# Patient Record
Sex: FEMALE | Race: WHITE | NOT HISPANIC OR LATINO | Employment: OTHER | URBAN - METROPOLITAN AREA
[De-identification: names, ages, dates, MRNs, and addresses within clinical notes are randomized per-mention and may not be internally consistent; named-entity substitution may affect disease eponyms.]

---

## 2017-11-16 ENCOUNTER — OFFICE VISIT (OUTPATIENT)
Dept: INTERNAL MEDICINE | Facility: CLINIC | Age: 57
End: 2017-11-16
Payer: COMMERCIAL

## 2017-11-16 VITALS
TEMPERATURE: 98 F | WEIGHT: 240.94 LBS | HEART RATE: 78 BPM | RESPIRATION RATE: 16 BRPM | DIASTOLIC BLOOD PRESSURE: 70 MMHG | BODY MASS INDEX: 41.13 KG/M2 | OXYGEN SATURATION: 97 % | HEIGHT: 64 IN | SYSTOLIC BLOOD PRESSURE: 110 MMHG

## 2017-11-16 DIAGNOSIS — M85.80 OSTEOPENIA, UNSPECIFIED LOCATION: ICD-10-CM

## 2017-11-16 DIAGNOSIS — Z23 NEED FOR PNEUMOCOCCAL VACCINATION: ICD-10-CM

## 2017-11-16 DIAGNOSIS — Z86.2 HISTORY OF ANEMIA: ICD-10-CM

## 2017-11-16 DIAGNOSIS — Z13.6 SCREENING FOR CARDIOVASCULAR CONDITION: ICD-10-CM

## 2017-11-16 DIAGNOSIS — R60.9 EDEMA, UNSPECIFIED TYPE: ICD-10-CM

## 2017-11-16 DIAGNOSIS — Z85.3 HISTORY OF CANCER OF LEFT BREAST: Primary | ICD-10-CM

## 2017-11-16 PROCEDURE — 90732 PPSV23 VACC 2 YRS+ SUBQ/IM: CPT | Mod: S$GLB,,, | Performed by: INTERNAL MEDICINE

## 2017-11-16 PROCEDURE — 90471 IMMUNIZATION ADMIN: CPT | Mod: S$GLB,,, | Performed by: INTERNAL MEDICINE

## 2017-11-16 PROCEDURE — 99204 OFFICE O/P NEW MOD 45 MIN: CPT | Mod: 25,S$GLB,, | Performed by: INTERNAL MEDICINE

## 2017-11-16 RX ORDER — MULTIVITAMIN
1 TABLET ORAL DAILY
COMMUNITY

## 2017-11-16 RX ORDER — RALOXIFENE HYDROCHLORIDE 60 MG/1
60 TABLET, FILM COATED ORAL DAILY
COMMUNITY
End: 2021-10-27 | Stop reason: SDUPTHER

## 2017-11-16 RX ORDER — SUCRALFATE 1 G/1
1 TABLET ORAL 2 TIMES DAILY
COMMUNITY
End: 2023-04-26 | Stop reason: ALTCHOICE

## 2017-11-16 RX ORDER — CELECOXIB 200 MG/1
200 CAPSULE ORAL
COMMUNITY
End: 2021-04-13

## 2017-11-16 RX ORDER — ZOLPIDEM TARTRATE 5 MG/1
5 TABLET ORAL NIGHTLY PRN
COMMUNITY
End: 2019-08-13 | Stop reason: SDUPTHER

## 2017-11-16 RX ORDER — HYDROCHLOROTHIAZIDE 25 MG/1
25 TABLET ORAL DAILY
Qty: 30 TABLET | Refills: 11 | Status: SHIPPED | OUTPATIENT
Start: 2017-11-16 | End: 2018-05-31 | Stop reason: CLARIF

## 2017-11-16 RX ORDER — ESCITALOPRAM OXALATE 10 MG/1
10 TABLET ORAL DAILY
COMMUNITY
End: 2023-04-26 | Stop reason: SDUPTHER

## 2017-11-16 NOTE — PROGRESS NOTES
"Subjective:      Patient ID: Luz Duke is a 57 y.o. female.    Chief Complaint: Establish Care    HPI: 57 y.o. White female,  -breast Ca, left, 3/05. Lumpectomy, 8 chemo Rx, 30 radiation treatments. Last  Mammogram 9/17  With a bone density. Long term survivor. Sees Dr. Vazquez in  [facility]. Sees him once a year.  -Treatment for osteopenia.  -Gastric bypass, 2004. Has had anemia, which had a work up with both EGD,colonoscopy.  -Shingles in 7/17.  -Had a flu shot at [employer].  -Plantar fasciitis, had a right bunionectomy.  Reviewed SH,FH ( strong paternal history of cancers).    Review of Systems   Constitutional: Negative.    HENT: Negative.    Eyes: Negative.    Respiratory: Negative.    Cardiovascular: Positive for leg swelling. Negative for chest pain and palpitations.   Gastrointestinal: Negative.    Endocrine: Negative.    Genitourinary: Negative.    Musculoskeletal: Negative.    Skin: Negative.    Allergic/Immunologic: Negative.    Neurological: Negative.    Hematological: Negative.    Psychiatric/Behavioral: Negative.        Objective:   /70 (BP Location: Right arm, Patient Position: Sitting, BP Method: Large (Manual))   Pulse 78   Temp 97.6 °F (36.4 °C) (Oral)   Resp 16   Ht 5' 4" (1.626 m)   Wt 109.3 kg (240 lb 15.4 oz)   SpO2 97%   BMI 41.36 kg/m²     Physical Exam   Constitutional: She is oriented to person, place, and time. She appears well-developed and well-nourished.   HENT:   Head: Normocephalic and atraumatic.   Right Ear: External ear normal.   Left Ear: External ear normal.   Nose: Nose normal.   Mouth/Throat: Oropharynx is clear and moist.   Eyes: Conjunctivae and EOM are normal. Pupils are equal, round, and reactive to light.   Neck: Normal range of motion. Neck supple. No JVD present.   Cardiovascular: Normal rate, regular rhythm and normal heart sounds.    Pulmonary/Chest: Effort normal and breath sounds normal.   Abdominal: Soft. Bowel sounds are normal.   Musculoskeletal: " She exhibits edema.   Lymphadenopathy:     She has no cervical adenopathy.   Neurological: She is alert and oriented to person, place, and time.   Skin: Skin is warm and dry.   Psychiatric: She has a normal mood and affect. Her behavior is normal.   Nursing note and vitals reviewed.      Assessment:     1. History of cancer of left breast    2. Osteopenia, unspecified location    3. History of anemia    4. Screening for cardiovascular condition    5. Edema, unspecified type    6. Need for pneumococcal vaccination      Plan:     History of cancer of left breast  -     CBC auto differential; Future; Expected date: 11/16/2017  -     Comprehensive metabolic panel; Future; Expected date: 11/16/2017  -     Urinalysis; Future; Expected date: 11/16/2017    Osteopenia, unspecified location    History of anemia  -     Iron and TIBC; Future; Expected date: 11/16/2017    Screening for cardiovascular condition  -     Lipid panel; Future; Expected date: 11/16/2017    Edema, unspecified type  -     hydroCHLOROthiazide (HYDRODIURIL) 25 MG tablet; Take 1 tablet (25 mg total) by mouth once daily.  Dispense: 30 tablet; Refill: 11    Need for pneumococcal vaccination  -     Pneumococcal Polysaccharide Vaccine (23 Valent) (SQ/IM)

## 2018-02-20 ENCOUNTER — OFFICE VISIT (OUTPATIENT)
Dept: INTERNAL MEDICINE | Facility: CLINIC | Age: 58
End: 2018-02-20
Payer: COMMERCIAL

## 2018-02-20 VITALS
BODY MASS INDEX: 40.76 KG/M2 | HEART RATE: 71 BPM | WEIGHT: 238.75 LBS | OXYGEN SATURATION: 97 % | RESPIRATION RATE: 18 BRPM | HEIGHT: 64 IN | DIASTOLIC BLOOD PRESSURE: 66 MMHG | SYSTOLIC BLOOD PRESSURE: 96 MMHG

## 2018-02-20 DIAGNOSIS — I73.9 PVD (PERIPHERAL VASCULAR DISEASE): Primary | ICD-10-CM

## 2018-02-20 DIAGNOSIS — R06.83 SNORING: ICD-10-CM

## 2018-02-20 DIAGNOSIS — R55 SYNCOPE, UNSPECIFIED SYNCOPE TYPE: ICD-10-CM

## 2018-02-20 PROCEDURE — 99999 PR PBB SHADOW E&M-EST. PATIENT-LVL IV: CPT | Mod: PBBFAC,,, | Performed by: INTERNAL MEDICINE

## 2018-02-20 PROCEDURE — 93010 ELECTROCARDIOGRAM REPORT: CPT | Mod: S$GLB,,, | Performed by: INTERNAL MEDICINE

## 2018-02-20 PROCEDURE — 93005 ELECTROCARDIOGRAM TRACING: CPT | Mod: S$GLB,,, | Performed by: INTERNAL MEDICINE

## 2018-02-20 PROCEDURE — 99214 OFFICE O/P EST MOD 30 MIN: CPT | Mod: S$GLB,,, | Performed by: INTERNAL MEDICINE

## 2018-02-20 PROCEDURE — 3008F BODY MASS INDEX DOCD: CPT | Mod: S$GLB,,, | Performed by: INTERNAL MEDICINE

## 2018-02-20 NOTE — PROGRESS NOTES
"Subjective:      Patient ID: Luz Duke is a 57 y.o. female.    Chief Complaint: Osteopenia; History of cancer of left breast; and Fall (during [place]ardi [place], thinks she blacked out)    HPI: 57 y.o. White female , while at [place], went into the bathroom to urinate, came out of bathroom and awakened later ( unknown time) on the floor sitting up.  She remembers nothing. No bowel or bladder loss. No headache,dizziness,CP,palpitations,SOB,edema,blood.  Has never had that before or after. No ETOH involved; however, may have over indulged with food that evening.  No vomiting,diarrhea.  No dysuria.  Has a painful tailbone.    Review of Systems   Constitutional: Negative.    HENT: Negative.    Eyes: Negative for photophobia, pain, discharge, redness, itching and visual disturbance.   Respiratory: Negative for choking, shortness of breath and wheezing.         Snores at night, awakens people   Cardiovascular: Negative.    Gastrointestinal: Negative.    Endocrine: Negative.    Genitourinary: Negative.    Musculoskeletal: Negative.    Skin: Negative.    Allergic/Immunologic: Negative.    Neurological: Positive for syncope. Negative for dizziness, tremors, seizures, facial asymmetry, speech difficulty, weakness, light-headedness, numbness and headaches.   Hematological: Negative.    Psychiatric/Behavioral: Negative.        Objective:   BP 96/66 (BP Location: Right arm, Patient Position: Sitting, BP Method: Large (Manual))   Pulse 71   Resp 18   Ht 5' 4" (1.626 m)   Wt 108.3 kg (238 lb 12.1 oz)   SpO2 97%   BMI 40.98 kg/m²     Physical Exam   Constitutional: She is oriented to person, place, and time. She appears well-developed and well-nourished.   HENT:   Head: Normocephalic and atraumatic.   Right Ear: External ear normal.   Left Ear: External ear normal.   Nose: Nose normal.   Mouth/Throat: Oropharynx is clear and moist.   Eyes: Conjunctivae and EOM are normal. Pupils are equal, round, and reactive to light. "   Neck: Normal range of motion. Neck supple.   Cardiovascular: Normal rate, regular rhythm and normal heart sounds.    Pulmonary/Chest: Effort normal and breath sounds normal.   Abdominal: Soft. Bowel sounds are normal.   Musculoskeletal: Normal range of motion.   Neurological: She is alert and oriented to person, place, and time.   Skin: Skin is warm and dry.   Psychiatric: She has a normal mood and affect. Her behavior is normal. Judgment and thought content normal.   Nursing note and vitals reviewed.      Assessment:     1. PVD (peripheral vascular disease)    2. Snoring    3. Syncope, unspecified syncope type    May have had a vaso vagal episode, no lasting effects.  If any new symptoms will direct a work up in that area.  Plan:     PVD (peripheral vascular disease)  -     COMPRESSION STOCKINGS  -     Lipid panel; Future; Expected date: 05/21/2018    Snoring  -     Ambulatory consult to Sleep Disorders  -     CBC auto differential; Future; Expected date: 05/23/2018  -     Comprehensive metabolic panel; Future; Expected date: 05/23/2018  -     Urinalysis; Future; Expected date: 05/21/2018    Syncope, unspecified syncope type  -     EKG 12-lead; Future; Expected date: 02/20/2018

## 2018-04-24 ENCOUNTER — TELEPHONE (OUTPATIENT)
Dept: ADMINISTRATIVE | Facility: HOSPITAL | Age: 58
End: 2018-04-24

## 2018-04-24 NOTE — TELEPHONE ENCOUNTER
This patient is part of a group of patients listed from the insurance company needing statin therapy, r/t PVD dx. Please place rx order or verify the dx is correct

## 2018-05-25 ENCOUNTER — OFFICE VISIT (OUTPATIENT)
Dept: SLEEP MEDICINE | Facility: CLINIC | Age: 58
End: 2018-05-25
Payer: COMMERCIAL

## 2018-05-25 VITALS
BODY MASS INDEX: 41.55 KG/M2 | HEIGHT: 64 IN | DIASTOLIC BLOOD PRESSURE: 78 MMHG | SYSTOLIC BLOOD PRESSURE: 132 MMHG | WEIGHT: 243.38 LBS | HEART RATE: 70 BPM

## 2018-05-25 DIAGNOSIS — G47.30 SLEEP APNEA, UNSPECIFIED TYPE: Primary | ICD-10-CM

## 2018-05-25 PROBLEM — G47.00 INSOMNIA, UNSPECIFIED: Status: ACTIVE | Noted: 2018-05-25

## 2018-05-25 PROBLEM — K21.9 GASTROESOPHAGEAL REFLUX DISEASE WITHOUT ESOPHAGITIS: Status: ACTIVE | Noted: 2018-05-25

## 2018-05-25 PROCEDURE — 99999 PR PBB SHADOW E&M-EST. PATIENT-LVL III: CPT | Mod: PBBFAC,,, | Performed by: NURSE PRACTITIONER

## 2018-05-25 PROCEDURE — 99204 OFFICE O/P NEW MOD 45 MIN: CPT | Mod: S$GLB,,, | Performed by: NURSE PRACTITIONER

## 2018-05-25 PROCEDURE — 3008F BODY MASS INDEX DOCD: CPT | Mod: CPTII,S$GLB,, | Performed by: NURSE PRACTITIONER

## 2018-05-25 NOTE — PROGRESS NOTES
Luz Duke  was seen as a new patient, referred by Dr. DANTE Dover , for the evaluation of obstructive sleep apnea.     CHIEF COMPLAINT: Snoring, excessively tired    HISTORY OF PRESENT ILLNESS: Luz Duke a 57 y.o. female presents for the evaluation of obstructive sleep apnea. She had a sleep study done 2003 at [facility]. Tried CPAP x 2mos then returned it. Was told she just needed to use it for a short time.Snoring was resolved. Current disruptive snoring at times. Nocturia 1x but easily returns to sleep. Wakes up un-refreshed, feels like she could sleep longer. Snoozes during evening plays.  Denies witnessed apneic pauses. R side sleeper and doesn't move much during sleep.     Denies symptoms of restless legs or kicking during sleep.   Weight watchers group - has lost 30# the past year.   GERD- carafate bid  Breathe right strips partially effective    EPWORTH SLEEPINESS SCALE 5/24/2018   Sitting and reading 1   Watching TV 0   Sitting, inactive in a public place (e.g. a theatre or a meeting) 3   As a passenger in a car for an hour without a break 3   Lying down to rest in the afternoon when circumstances permit 0   Sitting and talking to someone 0   Sitting quietly after a lunch without alcohol 0   In a car, while stopped for a few minutes in traffic 0   Total score 7         Sleep Clinic New Patient 5/24/2018   What time do you go to bed on a week day? (Give a range) 11 to 12   What time do you go to bed on a day off? (Give a range) Same   How long does it take you to fall asleep? (Give a range) Minutes   How long does it take you to fall back into sleep? (Give a range) Usually minutes , very rare longer   What time do you wake up to start your day on a week day? (Give a range) 9:00   What time do you wake up to start your day on a day off? (Give a range) 9:00   What time do you get out of bed? (Give a range) When I wake up   Rate your sleep quality from 0 to 5 (0-poor, 5-great). 3   Have you  "experienced:  N/a   Have you ever had a sleep study/CPAP machine/surgery for sleep apnea? Yes   Have you ever had a CPAP machine for sleep apnea? No   Have you ever had surgery for sleep apnea? No         FAMILY HISTORY: No known sleep disorders.     SOCIAL HISTORY: . rare ETOH, no tobacco, retired    REVIEW OF SYSTEMS:  Sleep related symptoms as per HPI; +overweight  Sleep Clinic ROS  5/24/2018   Difficulty breathing through the nose?  Seasonal congestion   Sore throat or dry mouth in the morning? No   Irregular or very fast heart beat?  No   Shortness of breath?  No   Acid reflux? Sometimes   Body aches and pains?  Sometimes   Morning headaches? No   Dizziness? No   Mood changes?  No   Do you exercise?  No   Do you feel like moving your legs a lot?  No           PHYSICAL EXAM:   /78   Pulse 70   Ht 5' 4" (1.626 m)   Wt 110.4 kg (243 lb 6.4 oz)   BMI 41.78 kg/m²   GENERAL: morbid obese body habitus, well groomed   HEENT: Conjunctivae are non-erythematous; Pupils equal, round, and reactive to light; Nose is symmetrical; Nasal mucosa is normal; Septum is midline; Inferior turbinates are normal; Nasal airflow is normal; Posterior pharynx is pink; Modified Mallampati: IV; Posterior palate is low; Tonsils not seen; Uvula is long/thick and pink, can't see distal tip;Tongue is high, broad; Dentition is fair; No TMJ tenderness; Jaw opening and protrusion without click and without discomfort.   NECK: Supple. Neck circumference is 15 inches. No thyromegaly. No palpable nodes.   SKIN: On face and neck: No abrasions, no rashes, no lesions. No subcutaneous nodules are palpable.   RESPIRATORY: Chest is clear to auscultation. Normal chest expansion and non-labored breathing at rest.   CARDIOVASCULAR: Normal S1, S2. No murmurs, gallops or rubs. No carotid bruits bilaterally.   EXTREMITIES: non-pitting swelling left lower leg.  No clubbing. No cyanosis. Station normal. Gait normal.   NEURO/PSYCH: Oriented to time, " place and person. Normal attention span and concentration. Affect is full. Mood is normal.       ASSESSMENT:     Unspecified Sleep Apnea, with symptoms of disruptive snoring, un-refreshing disrupted sleep and daytime sleepiness, with exam findings of a crowded oral airway, with medical comorbidities of GERD, morbid obesity. Warrants further investigation for untreated sleep apnea.     PLAN:   1. Polysomnogram, discussed plan of care   2. Discussed etiology of SIMONA and potential ramifications of untreated SIMONA, including stroke, heart disease, HTN.  We discussed potential treatment options, which could include weight loss (10-15%), body positioning, continuous positive airway pressure (CPAP-definitive), mandibular advancement splint by dentist, or referral for surgical consideration.   3. The patient was advised to abstain from driving should she feel sleepy or drowsy.   4. I will see the patient back after the study has been completed to review test results and plan of care.   5. Encouraged continued weight loss efforts for potential improvement of SIMONA and overall health benefits, continue breathe right strips    Thank you for allowing me the opportunity to participate in the care of your patient

## 2018-05-25 NOTE — PATIENT INSTRUCTIONS
Obstructive Sleep Apnea  Obstructive sleep apnea is a condition that causes your air passages to become narrowed or blocked during sleep. As a result, breathing stops for short periods. Your body wakes up enough for breathing to begin again, though you don't remember it. The cycle of stopped breathing and brief awakenings can repeat dozens of times a night. This prevents the body from getting to the deeper stages of sleep that are needed for good rest and may cause your body's oxygen level to fall.  Signs of sleep apnea include loud snoring, noisy breathing, and gasping sounds during sleep. Daytime symptoms include waking up tired after a full night's sleep, waking up with headaches, feeling very sleepy or falling asleep during the day, and having problems with memory or concentration.  Risk factors for sleep apnea include:  · Being overweight  · Being a man, or a woman in menopause  · Smoking  · Using alcohol or sedating medicines  · Having enlarged structures in the nose or throat  Home care  Lifestyle changes that can help treat snoring and sleep apnea include the following:  · If you are overweight, lose weight. Talk to your healthcare provider about a weight-loss plan for you.  · Avoid alcohol for 3 to 4 hours before bedtime. Avoid sedating medications. Ask your healthcare provider about the medicines you take.  · If you smoke, talk to your healthcare provider about ways to quit.  · Sleep on your side. This can help prevent gravity from pulling relaxed throat tissues into your breathing passages.  · If you have allergies or sinus problems that block your nose, ask your healthcare provider for help.  Follow-up care  Follow up with your healthcare provider, or as advised. A diagnosis of sleep apnea is made with a sleep study. Your healthcare provider can tell you more about this test.  When to seek medical advice  Sleep apnea can make you more likely to have certain health problems. These include high blood  pressure, heart attack, stroke, and sexual dysfunction. If you have sleep apnea, talk to your healthcare provider about the best treatments for you.  Date Last Reviewed: 4/1/2017 © 2000-2017 [employer]. [address] [place] [address], [place] [address]. All rights reserved. This information is not intended as a substitute for professional medical care. Always follow your healthcare professional's instructions.        What Are Snoring and Obstructive Sleep Apnea?  If youve ever had a stuffed-up nose, you know the feeling of trying to breathe through a very narrow passageway. This is what happens in your throat when you snore. While you sleep, structures in your throat partially block your air passage, making the passage narrow and hard to breathe through. If the entire passage becomes blocked and you cant breathe at all, you have sleep apnea.      Snoring Obstructive sleep apnea   Snoring  If your throat structures are too large or the muscles relax too much during sleep, the air passage may be partially blocked. As air from the nose or mouth passes around this blockage, the throat structures vibrate, causing the familiar sound of snoring. At times, this sound can be so loud that snorers wake up others, or even themselves, during the night. Snoring gets worse as more and more of the air passage is blocked.  Obstructive sleep apnea  If the structures completely block the throat, air cant flow to the lungs at all. This is called apnea (meaning no breathing). Since the lungs arent getting fresh air, the brain tells the body to wake up just enough to tighten the muscles and unblock the air passage. With a loud gasp, breathing begins again. This process may be repeated over and over again throughout the night, making your sleep fragmented with a lighter stage of sleep. Even though you do not remember waking up many times during the night to a lighter sleep, you feel tired the next day. The lack of sleep and  fresh air can also strain your lungs, heart, and other organs, leading to problems such as high blood pressure, heart attack, or stroke.  Problems in the nose and jaw  Problems in the structure of the nose may obstruct breathing. A crooked (deviated) septum or swollen turbinates can make snoring worse or lead to apnea. Also, a receding jaw may make the tongue sit too far back, so its more likely to block the airway when youre asleep.        Date Last Reviewed: 7/18/2015  © 7779-0140 [employer]. [address] [place] [address]. All rights reserved. This information is not intended as a substitute for professional medical care. Always follow your healthcare professional's instructions.      Abena or Calvin will contact you to schedule your sleep study. Their number is [phone] (ext 2). The [facility] is located on 7th floor of the [facility].    We will call you when the sleep study results are ready - if you have not heard from us by 2 weeks from the date of the study, please call [phone] (ext 1).    You are advised to abstain from driving should you feel sleepy or drowsy.

## 2018-05-25 NOTE — LETTER
May 25, 2018      Sam Dover MD  [address]  [address]  [place] [address]           [facility]  [address] [address]  Phone: [phone]  Fax: [phone]          Patient: Luz Duke   MR Number: [id number]   YOB: 1960   Date of Visit: 5/25/2018       Dear Dr. Sam Dover:    Thank you for referring Luz Duke to me for evaluation. Attached you will find relevant portions of my assessment and plan of care.    If you have questions, please do not hesitate to call me. I look forward to following Luz Duke along with you.    Sincerely,    Angie Ledesma, NP    Enclosure  CC:  No Recipients    If you would like to receive this communication electronically, please contact [email or web address] or [phone] to request more information on [employer] Link access.    For providers and/or their staff who would like to refer a patient to [facility], please contact us through our one-stop-shop provider referral line, [facility] , at [phone].    If you feel you have received this communication in error or would no longer like to receive these types of communications, please e-mail [email or web address]

## 2018-05-31 ENCOUNTER — OFFICE VISIT (OUTPATIENT)
Dept: INTERNAL MEDICINE | Facility: CLINIC | Age: 58
End: 2018-05-31
Payer: COMMERCIAL

## 2018-05-31 VITALS
SYSTOLIC BLOOD PRESSURE: 120 MMHG | DIASTOLIC BLOOD PRESSURE: 62 MMHG | WEIGHT: 244.5 LBS | HEART RATE: 86 BPM | OXYGEN SATURATION: 97 % | HEIGHT: 64 IN | BODY MASS INDEX: 41.74 KG/M2

## 2018-05-31 DIAGNOSIS — E87.6 HYPOKALEMIA: ICD-10-CM

## 2018-05-31 DIAGNOSIS — R60.0 LEG EDEMA, LEFT: Primary | ICD-10-CM

## 2018-05-31 PROCEDURE — 99213 OFFICE O/P EST LOW 20 MIN: CPT | Mod: S$GLB,,, | Performed by: INTERNAL MEDICINE

## 2018-05-31 PROCEDURE — 99999 PR PBB SHADOW E&M-EST. PATIENT-LVL III: CPT | Mod: PBBFAC,,, | Performed by: INTERNAL MEDICINE

## 2018-05-31 PROCEDURE — 3008F BODY MASS INDEX DOCD: CPT | Mod: CPTII,S$GLB,, | Performed by: INTERNAL MEDICINE

## 2018-05-31 RX ORDER — HYDROCHLOROTHIAZIDE 25 MG/1
25 TABLET ORAL DAILY
Refills: 11 | COMMUNITY
Start: 2018-05-01 | End: 2018-05-31 | Stop reason: CLARIF

## 2018-05-31 RX ORDER — SPIRONOLACTONE 50 MG/1
50 TABLET, FILM COATED ORAL DAILY
Qty: 30 TABLET | Refills: 11 | Status: SHIPPED | OUTPATIENT
Start: 2018-05-31 | End: 2019-05-11 | Stop reason: SDUPTHER

## 2018-05-31 NOTE — PROGRESS NOTES
Subjective:      Patient ID: Luz Duke is a 57 y.o. female.    Chief Complaint: Follow-up    HPI: 57 y.o. White female , here for follow up.  Has her apt with GI tomorrow, Gyn in Aug, saw the Optho.  Went for initial interview for sleep study, they will call for a follow up.  Asymptomatic.  Labs:   05/16/18 0807 HDL 63 - Final result   05/16/18 0807 CHOL 166 - Final result   05/16/18 0807 TRIG 79 - Final result   05/16/18 0807 LDLCALC 87.2 - Final result   05/16/18 0807 CHOLHDL 38.0 - Final result   05/16/18 0807 NONHDLCHOL 103 - Final result   05/16/18 0807 TOTALCHOLEST 2.6 - Final result   05/16/18 0807 COLORU Yellow - Final result   05/16/18 0807 APPEARANCEUA Clear - Final result   05/16/18 0807 SPECGRAV 1.015 - Final result   05/16/18 0807 PHUR 7.0 - Final result   05/16/18 0807 KETONESU Negative - Final result   05/16/18 0807 OCCULTUA Negative - Final result   05/16/18 0807 NITRITE Negative - Final result   05/16/18 0807 UROBILINOGEN Negative - Final result   05/16/18 0807 LEUKOCYTESUR Negative - Final result   05/16/18 0807 WBC 6.69 - Final result   05/16/18 0807 RBC 4.59 - Final result   05/16/18 0807 HGB 13.2 - Final result   05/16/18 0807 HCT 40.0 - Final result   05/16/18 0807 MCH 28.8 - Final result   05/16/18 0807 RDW 13.5 - Final result   05/16/18 0807  - Final result   05/16/18 0807 MPV 11.3 - Final result   05/16/18 0807 GLU 83 - Final result   05/16/18 0807 BUN 14 - Final result   05/16/18 0807 CREATININE 0.52 - Final result   05/16/18 0807 CALCIUM 8.8 - Final result   05/16/18 0807  - Final result   05/16/18 0807 K 3.3 Low Final result   05/16/18 0807  - Final result   05/16/18 0807 PROT 6.3 - Final result   05/16/18 0807 ALBUMIN 3.6 - Final result   05/16/18 0807 BILITOT 0.4 - Final result   05/16/18 0807 AST 26 - Final result   05/16/18 0807 ALKPHOS 73 - Final result   05/16/18 0807 CO2 31 High Final result   05/16/18 0807 ALT 30 - Final result   05/16/18 0807 ANIONGAP 8  "- Final result   05/16/18 0807 EGFRNONAA >60.0 - Final result   05/16/18 0807 ESTGFRAFRICA >60.0 - Final result   05/16/18 0807             Review of Systems   Constitutional: Negative for activity change and unexpected weight change.   HENT: Negative for hearing loss, rhinorrhea and trouble swallowing.    Eyes: Negative for discharge and visual disturbance.   Respiratory: Negative for chest tightness and wheezing.    Cardiovascular: Negative for chest pain and palpitations.   Gastrointestinal: Negative for blood in stool, constipation, diarrhea and vomiting.   Endocrine: Negative for polydipsia and polyuria.   Genitourinary: Negative for difficulty urinating, dysuria, hematuria and menstrual problem.   Musculoskeletal: Positive for neck pain. Negative for arthralgias and joint swelling.   Neurological: Negative for weakness and headaches.   Psychiatric/Behavioral: Negative for confusion and dysphoric mood.       Objective:   /62   Pulse 86   Ht 5' 4" (1.626 m)   Wt 110.9 kg (244 lb 8 oz)   SpO2 97%   BMI 41.97 kg/m²     Physical Exam   Constitutional: She is oriented to person, place, and time. She appears well-developed and well-nourished.   HENT:   Head: Normocephalic and atraumatic.   Nose: Nose normal.   Mouth/Throat: Oropharynx is clear and moist.   Eyes: Conjunctivae and EOM are normal.   Neck: Neck supple.   Cardiovascular: Normal rate, regular rhythm and normal heart sounds.    Pulmonary/Chest: Effort normal and breath sounds normal.   Musculoskeletal: Normal range of motion.   Neurological: She is alert and oriented to person, place, and time.   Skin: Skin is warm and dry.   Psychiatric: She has a normal mood and affect. Her behavior is normal.   Nursing note and vitals reviewed.      Assessment:     1. Leg edema, left    2. Hypokalemia      Plan:     Leg edema, left  -     spironolactone (ALDACTONE) 50 MG tablet; Take 1 tablet (50 mg total) by mouth once daily.  Dispense: 30 tablet; Refill: " 11    Hypokalemia  -     Basic metabolic panel; Future; Expected date: 09/28/2018

## 2018-06-05 DIAGNOSIS — G47.30 SLEEP APNEA, UNSPECIFIED TYPE: Primary | ICD-10-CM

## 2018-06-14 ENCOUNTER — TELEPHONE (OUTPATIENT)
Dept: SLEEP MEDICINE | Facility: OTHER | Age: 58
End: 2018-06-14

## 2018-06-15 ENCOUNTER — HOSPITAL ENCOUNTER (OUTPATIENT)
Dept: SLEEP MEDICINE | Facility: OTHER | Age: 58
Discharge: HOME | End: 2018-06-15
Attending: NURSE PRACTITIONER
Payer: COMMERCIAL

## 2018-06-15 DIAGNOSIS — G47.20 SLEEP STAGE DYSFUNCTION: ICD-10-CM

## 2018-06-15 DIAGNOSIS — G47.33 OSA (OBSTRUCTIVE SLEEP APNEA): ICD-10-CM

## 2018-06-15 DIAGNOSIS — G47.30 SLEEP APNEA, UNSPECIFIED TYPE: ICD-10-CM

## 2018-06-15 PROCEDURE — 95800 SLP STDY UNATTENDED: CPT

## 2018-06-15 PROCEDURE — 95806 SLEEP STUDY UNATT&RESP EFFT: CPT | Mod: 26,,, | Performed by: PSYCHIATRY & NEUROLOGY

## 2018-07-11 ENCOUNTER — PATIENT MESSAGE (OUTPATIENT)
Dept: SLEEP MEDICINE | Facility: CLINIC | Age: 58
End: 2018-07-11

## 2018-07-11 DIAGNOSIS — G47.30 SLEEP APNEA, UNSPECIFIED TYPE: Primary | ICD-10-CM

## 2018-09-05 ENCOUNTER — PATIENT MESSAGE (OUTPATIENT)
Dept: FAMILY MEDICINE | Facility: CLINIC | Age: 58
End: 2018-09-05

## 2018-09-25 ENCOUNTER — OFFICE VISIT (OUTPATIENT)
Dept: INTERNAL MEDICINE | Facility: CLINIC | Age: 58
End: 2018-09-25
Payer: COMMERCIAL

## 2018-09-25 ENCOUNTER — TELEPHONE (OUTPATIENT)
Dept: INTERNAL MEDICINE | Facility: CLINIC | Age: 58
End: 2018-09-25

## 2018-09-25 VITALS
SYSTOLIC BLOOD PRESSURE: 110 MMHG | BODY MASS INDEX: 42.9 KG/M2 | OXYGEN SATURATION: 97 % | HEART RATE: 90 BPM | HEIGHT: 64 IN | RESPIRATION RATE: 16 BRPM | WEIGHT: 251.31 LBS | DIASTOLIC BLOOD PRESSURE: 70 MMHG | TEMPERATURE: 99 F

## 2018-09-25 DIAGNOSIS — R60.0 EDEMA OF LEG: ICD-10-CM

## 2018-09-25 DIAGNOSIS — L30.9 DERMATITIS: ICD-10-CM

## 2018-09-25 DIAGNOSIS — R53.83 FATIGUE, UNSPECIFIED TYPE: Primary | ICD-10-CM

## 2018-09-25 DIAGNOSIS — G47.33 OSA (OBSTRUCTIVE SLEEP APNEA): ICD-10-CM

## 2018-09-25 DIAGNOSIS — Z23 NEED FOR INFLUENZA VACCINATION: ICD-10-CM

## 2018-09-25 PROCEDURE — 90686 IIV4 VACC NO PRSV 0.5 ML IM: CPT | Mod: S$GLB,,, | Performed by: INTERNAL MEDICINE

## 2018-09-25 PROCEDURE — 90471 IMMUNIZATION ADMIN: CPT | Mod: S$GLB,,, | Performed by: INTERNAL MEDICINE

## 2018-09-25 PROCEDURE — 3008F BODY MASS INDEX DOCD: CPT | Mod: CPTII,S$GLB,, | Performed by: INTERNAL MEDICINE

## 2018-09-25 PROCEDURE — 99214 OFFICE O/P EST MOD 30 MIN: CPT | Mod: 25,S$GLB,, | Performed by: INTERNAL MEDICINE

## 2018-09-25 PROCEDURE — 99999 PR PBB SHADOW E&M-EST. PATIENT-LVL III: CPT | Mod: PBBFAC,,, | Performed by: INTERNAL MEDICINE

## 2018-09-25 RX ORDER — ALBUTEROL SULFATE 90 UG/1
AEROSOL, METERED RESPIRATORY (INHALATION)
Refills: 0 | COMMUNITY
Start: 2018-08-08 | End: 2021-04-13

## 2018-09-25 RX ORDER — NYSTATIN 100000 U/G
CREAM TOPICAL
Qty: 15 G | Refills: 3 | Status: SHIPPED | OUTPATIENT
Start: 2018-09-25 | End: 2020-02-13

## 2018-09-25 NOTE — PROGRESS NOTES
"Subjective:      Patient ID: Luz Duke is a 58 y.o. female.    Chief Complaint: Follow-up (4 month follow uu) and Medication Refill    HPI: 58 y.o. White female, had a banana and peanut butter sandwich, and had labs:  09/18/18 0821  Abnormal  High Final result   09/18/18 0821 BUN 16 - Final result   09/18/18 0821 CREATININE 0.66 - Final result   09/18/18 0821 CALCIUM 9.1 - Final result   09/18/18 0821  - Final result   09/18/18 0821 K 4.1 - Final result   09/18/18 0821  -      09/18/18 0821 CO2 25 - Final result   05/16/18 0807 ALT 30 - Final result   09/18/18 0821 ANIONGAP 9 - Final result   09/18/18 0821 EGFRNONAA >60.0 - Final result   09/18/18 0821 ESTGFRAFRICA >60.0 - Final result   05/16/18 0807 MCV 87 -      Complaining of itching on nipple of left breast.  Fatigued, feels like she could sleep all day. Awakens tired. Did home testing for SIMONA, which was  Inconclusive.    Bone density 9/12/17. Has had her Mammogram this year. Had a Pap last year.  Review of Systems   Constitutional: Negative.    HENT: Negative.  Negative for nosebleeds.    Respiratory: Negative for cough, chest tightness, shortness of breath and wheezing.    Cardiovascular: Positive for leg swelling. Negative for chest pain and palpitations.        LLL swelling   Genitourinary: Negative.    Musculoskeletal: Positive for arthralgias, gait problem and joint swelling.        Significant arthritis left knee   Psychiatric/Behavioral: Negative.        Objective:   /70 (BP Location: Right arm, Patient Position: Sitting, BP Method: Large (Manual))   Pulse 90   Temp 98.5 °F (36.9 °C) (Oral)   Resp 16   Ht 5' 4" (1.626 m)   Wt 114 kg (251 lb 4.8 oz)   SpO2 97%   BMI 43.14 kg/m²     Physical Exam   Constitutional: She is oriented to person, place, and time. She appears well-developed and well-nourished.   HENT:   Head: Normocephalic and atraumatic.   Right Ear: External ear normal.   Left Ear: External ear normal. "   Nose: Nose normal.   Mouth/Throat: Oropharynx is clear and moist.   Eyes: Conjunctivae and EOM are normal. Pupils are equal, round, and reactive to light.   Neck: Normal range of motion. Neck supple.   Cardiovascular: Normal rate, regular rhythm and normal heart sounds.   Pulmonary/Chest: Effort normal and breath sounds normal.   Abdominal: Soft. Bowel sounds are normal.   Musculoskeletal: She exhibits edema.   LLL   1+ edema   Neurological: She is alert and oriented to person, place, and time.   Skin: Skin is warm and dry.   scaley rash left nipple   Psychiatric: She has a normal mood and affect. Her behavior is normal.   Nursing note and vitals reviewed.      Assessment:     1. Fatigue, unspecified type    2. Edema of leg    3. SIMONA (obstructive sleep apnea)    4. Need for influenza vaccination    5. Dermatitis      Plan:     Fatigue, unspecified type  -     CBC auto differential; Future; Expected date: 12/26/2018  -     Comprehensive metabolic panel; Future; Expected date: 12/26/2018  -     Vitamin B12; Future; Expected date: 12/26/2018  -     Iron and TIBC; Future; Expected date: 12/25/2018    Edema of leg    SIMONA (obstructive sleep apnea)    Need for influenza vaccination  -     Influenza - Quadrivalent (3 years & older) (PF)    Dermatitis  -     nystatin (MYCOSTATIN) cream; Apply twice a day  Dispense: 15 g; Refill: 3

## 2018-09-25 NOTE — TELEPHONE ENCOUNTER
----- Message from Vanessa Bess sent at 9/25/2018  1:31 PM CDT -----  Contact: self, [phone] or [phone]  Patient states she was seen today and was supposed to get an ointment or cream for dry patch on her breast.

## 2018-12-10 ENCOUNTER — TELEPHONE (OUTPATIENT)
Dept: INTERNAL MEDICINE | Facility: CLINIC | Age: 58
End: 2018-12-10

## 2018-12-10 NOTE — TELEPHONE ENCOUNTER
----- Message from Edwardo Castillo sent at 12/10/2018 10:22 AM CST -----  Contact: self [phone]  Patient needs procedure code to give her insurance company to see if they will cover her lab work. Please call and advise.

## 2018-12-10 NOTE — TELEPHONE ENCOUNTER
Patient called to ask for CPT codes and informed her that she would need to contact the billing department to get those codes.     Explained to patient that we don't have those codes in the office. Patient verbalized understanding

## 2018-12-11 ENCOUNTER — TELEPHONE (OUTPATIENT)
Dept: INTERNAL MEDICINE | Facility: CLINIC | Age: 58
End: 2018-12-11

## 2018-12-11 NOTE — TELEPHONE ENCOUNTER
----- Message from Tarah Canela sent at 12/10/2018  2:44 PM CST -----  Contact: [phone]/self   Patient called to ask for CPT codes. Please advise.

## 2018-12-11 NOTE — TELEPHONE ENCOUNTER
Called patient back and in the middle of the phone call her phone cut out. Tried to call patient back but phone cut out again.

## 2018-12-12 ENCOUNTER — TELEPHONE (OUTPATIENT)
Dept: FAMILY MEDICINE | Facility: CLINIC | Age: 58
End: 2018-12-12

## 2018-12-12 NOTE — TELEPHONE ENCOUNTER
----- Message from Leonarda Mcqueen sent at 12/11/2018  2:35 PM CST -----  Contact: self/[phone]  Patient is returning your call.  Please advise

## 2018-12-13 ENCOUNTER — TELEPHONE (OUTPATIENT)
Dept: FAMILY MEDICINE | Facility: CLINIC | Age: 58
End: 2018-12-13

## 2018-12-13 NOTE — TELEPHONE ENCOUNTER
----- Message from Tiffanie Arredondo sent at 12/13/2018  3:01 PM CST -----  Contact: self / [phone]  Patient is returning a call from your office. Please advise  CPT code

## 2018-12-18 ENCOUNTER — TELEPHONE (OUTPATIENT)
Dept: INTERNAL MEDICINE | Facility: CLINIC | Age: 58
End: 2018-12-18

## 2018-12-18 NOTE — TELEPHONE ENCOUNTER
----- Message from Pam Sousa sent at 12/18/2018 10:08 AM CST -----  Contact: [phone]/self  Pt is returning your call to [facility] appt. Please call and advise.

## 2018-12-24 DIAGNOSIS — R53.83 FATIGUE, UNSPECIFIED TYPE: Primary | ICD-10-CM

## 2018-12-26 ENCOUNTER — OFFICE VISIT (OUTPATIENT)
Dept: FAMILY MEDICINE | Facility: CLINIC | Age: 58
End: 2018-12-26
Payer: COMMERCIAL

## 2018-12-26 VITALS
OXYGEN SATURATION: 95 % | WEIGHT: 259.25 LBS | SYSTOLIC BLOOD PRESSURE: 112 MMHG | BODY MASS INDEX: 44.26 KG/M2 | HEART RATE: 92 BPM | HEIGHT: 64 IN | DIASTOLIC BLOOD PRESSURE: 62 MMHG | TEMPERATURE: 99 F

## 2018-12-26 DIAGNOSIS — G47.33 OSA (OBSTRUCTIVE SLEEP APNEA): ICD-10-CM

## 2018-12-26 DIAGNOSIS — R60.0 EDEMA OF LEFT LOWER EXTREMITY: ICD-10-CM

## 2018-12-26 DIAGNOSIS — R53.83 FATIGUE, UNSPECIFIED TYPE: Primary | ICD-10-CM

## 2018-12-26 DIAGNOSIS — Z78.0 OSTEOPENIA AFTER MENOPAUSE: ICD-10-CM

## 2018-12-26 DIAGNOSIS — M85.80 OSTEOPENIA AFTER MENOPAUSE: ICD-10-CM

## 2018-12-26 PROCEDURE — 99999 PR PBB SHADOW E&M-EST. PATIENT-LVL III: CPT | Mod: PBBFAC,,, | Performed by: NURSE PRACTITIONER

## 2018-12-26 PROCEDURE — 99214 OFFICE O/P EST MOD 30 MIN: CPT | Mod: S$GLB,,, | Performed by: NURSE PRACTITIONER

## 2018-12-26 PROCEDURE — 3008F BODY MASS INDEX DOCD: CPT | Mod: CPTII,S$GLB,, | Performed by: NURSE PRACTITIONER

## 2018-12-26 RX ORDER — AZELASTINE 1 MG/ML
SPRAY, METERED NASAL
Refills: 0 | COMMUNITY
Start: 2018-10-22 | End: 2021-04-13

## 2018-12-26 RX ORDER — DIPHENOXYLATE HYDROCHLORIDE AND ATROPINE SULFATE 2.5; .025 MG/1; MG/1
1 TABLET ORAL
Refills: 5 | COMMUNITY
Start: 2018-11-24 | End: 2021-04-13

## 2018-12-26 RX ORDER — ZOSTER VACCINE RECOMBINANT, ADJUVANTED 50 MCG/0.5
KIT INTRAMUSCULAR
Refills: 0 | COMMUNITY
Start: 2018-11-08 | End: 2018-12-26 | Stop reason: ALTCHOICE

## 2018-12-26 NOTE — PROGRESS NOTES
Subjective:       Patient ID: Sukhjinder Duke is a 58 y.o. female.    Chief Complaint: Follow-up (3 month F/U)    57 y/o female present to clinic for 3 month follow up and lab results. Dr. Dover's patient. Hx of LLE edema, osteopenia, and fatigue. States feels of fatigue have decreased and do not interfere with her ability to perform daily household chores and ADLs. She feels she sleeps longer and was under the impression that as you age you sleep less. Negative home sleep study test in 6/2018, recommendation for sleep study in lab. Does not need medication refills at this time.      CBC, CMP, TSH, iron studies, Vitamin B12 were within an acceptable range.    Results for SUKHJINDER DUKE (MRN [id number]) as of 12/26/2018 14:36   Ref. Range 12/14/2018 09:07 12/26/2018 07:51   WBC Latest Ref Range: 3.90 - 12.70 K/uL 8.09    RBC Latest Ref Range: 4.00 - 5.40 M/uL 4.63    Hemoglobin Latest Ref Range: 12.0 - 16.0 g/dL 13.1    Hematocrit Latest Ref Range: 37.0 - 48.5 % 40.3    MCV Latest Ref Range: 82 - 98 fL 87    MCH Latest Ref Range: 27.0 - 31.0 pg 28.3    MCHC Latest Ref Range: 32.0 - 36.0 g/dL 32.5    RDW Latest Ref Range: 11.5 - 14.5 % 13.5    Platelets Latest Ref Range: 150 - 350 K/uL 307    MPV Latest Ref Range: 9.2 - 12.9 fL 10.3    Gran% Latest Ref Range: 38.0 - 73.0 % 72.4    Gran # (ANC) Latest Ref Range: 1.8 - 7.7 K/uL 5.9    Lymph% Latest Ref Range: 18.0 - 48.0 % 15.2 (L)    Lymph # Latest Ref Range: 1.0 - 4.8 K/uL 1.2    Mono% Latest Ref Range: 4.0 - 15.0 % 8.4    Mono # Latest Ref Range: 0.3 - 1.0 K/uL 0.7    Eosinophil% Latest Ref Range: 0.0 - 8.0 % 3.3    Eos # Latest Ref Range: 0.0 - 0.5 K/uL 0.3    Basophil% Latest Ref Range: 0.0 - 1.9 % 0.7    Baso # Latest Ref Range: 0.00 - 0.20 K/uL 0.06    Differential Method Unknown Automated    Iron Latest Ref Range: 30 - 160 ug/dL 112    TIBC Latest Ref Range: 250 - 450 ug/dL 482 (H)    Saturated Iron Latest Ref Range: 20 - 50 % 23    Transferrin Latest Ref  Range: 200 - 375 mg/dL 326    Vitamin B-12 Latest Ref Range: 210 - 950 pg/mL 378    Sodium Latest Ref Range: 136 - 145 mmol/L 141    Potassium Latest Ref Range: 3.5 - 5.1 mmol/L 4.5    Chloride Latest Ref Range: 95 - 110 mmol/L 106    CO2 Latest Ref Range: 23 - 29 mmol/L 29    Anion Gap Latest Ref Range: 8 - 16 mmol/L 6 (L)    BUN, Bld Latest Ref Range: 7 - 17 mg/dL 19 (H)    Creatinine Latest Ref Range: 0.50 - 1.40 mg/dL 0.66    eGFR if non African American Latest Ref Range: >60 mL/min/1.73 m^2 >60.0    eGFR if African American Latest Ref Range: >60 mL/min/1.73 m^2 >60.0    Glucose Latest Ref Range: 70 - 110 mg/dL 86    Calcium Latest Ref Range: 8.7 - 10.5 mg/dL 9.2    Alkaline Phosphatase Latest Ref Range: 38 - 126 U/L 88    Total Protein Latest Ref Range: 6.0 - 8.4 g/dL 6.7    Albumin Latest Ref Range: 3.5 - 5.2 g/dL 3.8    Total Bilirubin Latest Ref Range: 0.1 - 1.0 mg/dL 0.4    AST Latest Ref Range: 15 - 46 U/L 21    ALT Latest Ref Range: 10 - 44 U/L 19    TSH Latest Ref Range: 0.400 - 4.000 uIU/mL  2.680     Current Outpatient Medications   Medication Sig Dispense Refill    azelastine (ASTELIN) 137 mcg (0.1 %) nasal spray   0    CALCIUM CARBONATE/VITAMIN D3 (CALTRATE WITH VITAMIN D3 ORAL) Take 1 tablet by mouth 2 (two) times daily.      celecoxib (CELEBREX) 200 MG capsule Take 200 mg by mouth once daily.      diphenoxylate-atropine 2.5-0.025 mg (LOMOTIL) 2.5-0.025 mg per tablet   5    escitalopram oxalate (LEXAPRO) 10 MG tablet Take 10 mg by mouth once daily.      multivitamin (ONE DAILY MULTIVITAMIN) per tablet Take 1 tablet by mouth once daily.      PROAIR HFA 90 mcg/actuation inhaler as needed.  0    raloxifene (EVISTA) 60 mg tablet Take 60 mg by mouth once daily.      spironolactone (ALDACTONE) 50 MG tablet Take 1 tablet (50 mg total) by mouth once daily. 30 tablet 11    sucralfate (CARAFATE) 1 gram tablet Take 1 g by mouth 2 (two) times daily.      zolpidem (AMBIEN) 5 MG Tab Take 5 mg by mouth  nightly as needed.      LORATADINE ORAL Take by mouth.      nystatin (MYCOSTATIN) cream Apply twice a day 15 g 3     No current facility-administered medications for this visit.        Past Medical History:   Diagnosis Date    Breast cancer 2005    chemo/lumpectomy     Depression     Encounter for blood transfusion     GERD (gastroesophageal reflux disease)        Past Surgical History:   Procedure Laterality Date    BREAST SURGERY Left 2005    lumpectomy,chemo and radiation    BUNIONECTOMY Right 2016     SECTION      FOOT SURGERY Left     GASTRIC BYPASS  2004    TUBAL LIGATION         Family History   Problem Relation Age of Onset    Cancer Father     Cancer Paternal Aunt     Cancer Paternal Uncle        Social History     Socioeconomic History    Marital status:      Spouse name: None    Number of children: 1    Years of education: None    Highest education level: None   Social Needs    Financial resource strain: None    Food insecurity - worry: None    Food insecurity - inability: None    Transportation needs - medical: None    Transportation needs - non-medical: None   Occupational History    Occupation: Worked for [employer]   Tobacco Use    Smoking status: Never Smoker    Smokeless tobacco: Never Used   Substance and Sexual Activity    Alcohol use: Yes     Alcohol/week: 1.2 oz     Types: 1 Cans of beer, 1 Glasses of wine per week     Comment: occ    Drug use: No    Sexual activity: No   Other Topics Concern    None   Social History Narrative    None       Review of Systems   Constitutional: Negative for fatigue, fever and unexpected weight change.   HENT: Negative.    Respiratory: Negative for shortness of breath.    Cardiovascular: Positive for leg swelling (LLE). Negative for chest pain and palpitations.   Gastrointestinal: Negative for abdominal pain and blood in stool.   Musculoskeletal: Negative for arthralgias and back pain.   Neurological:  "Negative for weakness and headaches.   Psychiatric/Behavioral: Negative for decreased concentration and dysphoric mood.         Objective:     Vitals:    12/26/18 1405   BP: 112/62   BP Location: Right arm   Patient Position: Sitting   BP Method: Large (Manual)   Pulse: 92   Temp: 98.6 °F (37 °C)   TempSrc: Oral   SpO2: 95%   Weight: 117.6 kg (259 lb 4.2 oz)   Height: 5' 4" (1.626 m)          Physical Exam   Constitutional: She is oriented to person, place, and time. She appears well-developed and well-nourished.   +obesity with: Body mass index is 44.5 kg/m².     HENT:   Head: Normocephalic.   Eyes: EOM are normal. Pupils are equal, round, and reactive to light.   Neck: Normal range of motion. Neck supple. Carotid bruit is not present. No thyroid mass and no thyromegaly present.   Cardiovascular: Normal rate, regular rhythm and normal heart sounds.   Pulses:       Posterior tibial pulses are 1+ on the right side, and 1+ on the left side.   Pulmonary/Chest: Effort normal and breath sounds normal.   Abdominal: Soft. Bowel sounds are normal. She exhibits no distension. There is no tenderness.   Musculoskeletal: Normal range of motion.        Left lower leg: She exhibits edema (+1 edema).   Neurological: She is alert and oriented to person, place, and time.   Skin: Skin is warm and dry.   Psychiatric: She has a normal mood and affect. Her behavior is normal. Judgment normal.         Assessment:         ICD-10-CM ICD-9-CM   1. Fatigue, unspecified type R53.83 780.79   2. SIMONA (obstructive sleep apnea) G47.33 327.23   3. Edema of left lower extremity R60.0 782.3   4. Osteopenia after menopause M81.0 733.01       Plan:       Fatigue, unspecified type  -encouraged frequent rest periods  -establish sleep routine    SIMONA (obstructive sleep apnea)  -follow up with Sleep Medicine for in lab sleep study    Edema of left lower extremity  -encouraged elevated lower extremities  -continue spironolactone    Osteopenia after " menopause  -continue Evista  -[facility] at [facility] managing      Follow-up in about 6 months (around 6/26/2019) for PCP.        Medication List           Accurate as of 12/26/18  3:06 PM. If you have any questions, ask your nurse or doctor.               CONTINUE taking these medications    AMBIEN 5 MG Tab  Generic drug:  zolpidem     azelastine 137 mcg (0.1 %) nasal spray  Commonly known as:  ASTELIN     CALTRATE WITH VITAMIN D3 ORAL     celecoxib 200 MG capsule  Commonly known as:  CeleBREX     diphenoxylate-atropine 2.5-0.025 mg 2.5-0.025 mg per tablet  Commonly known as:  LOMOTIL     escitalopram oxalate 10 MG tablet  Commonly known as:  LEXAPRO     EVISTA 60 mg tablet  Generic drug:  raloxifene     LORATADINE ORAL     nystatin cream  Commonly known as:  MYCOSTATIN  Apply twice a day     ONE DAILY MULTIVITAMIN per tablet  Generic drug:  multivitamin     PROAIR HFA 90 mcg/actuation inhaler  Generic drug:  albuterol     spironolactone 50 MG tablet  Commonly known as:  ALDACTONE  Take 1 tablet (50 mg total) by mouth once daily.     sucralfate 1 gram tablet  Commonly known as:  CARAFATE        STOP taking these medications    SHINGRIX (PF) 50 mcg/0.5 mL injection  Generic drug:  varicella-zoster [id number] (PF)  Stopped by:  ALINA Fernandez

## 2019-03-19 DIAGNOSIS — C50.119 MALIGNANT NEOPLASM OF CENTRAL PORTION OF UNSPECIFIED FEMALE BREAST: Primary | ICD-10-CM

## 2019-03-29 ENCOUNTER — LAB VISIT (OUTPATIENT)
Dept: LAB | Facility: HOSPITAL | Age: 59
End: 2019-03-29
Attending: INTERNAL MEDICINE
Payer: COMMERCIAL

## 2019-03-29 ENCOUNTER — OFFICE VISIT (OUTPATIENT)
Dept: INTERNAL MEDICINE | Facility: CLINIC | Age: 59
End: 2019-03-29
Payer: COMMERCIAL

## 2019-03-29 VITALS
WEIGHT: 251.75 LBS | HEART RATE: 76 BPM | HEIGHT: 64 IN | DIASTOLIC BLOOD PRESSURE: 82 MMHG | BODY MASS INDEX: 42.98 KG/M2 | SYSTOLIC BLOOD PRESSURE: 128 MMHG | OXYGEN SATURATION: 98 %

## 2019-03-29 DIAGNOSIS — M46.1 SACROILIITIS: ICD-10-CM

## 2019-03-29 DIAGNOSIS — Z00.00 ROUTINE GENERAL MEDICAL EXAMINATION AT A HEALTH CARE FACILITY: ICD-10-CM

## 2019-03-29 DIAGNOSIS — Z00.00 ROUTINE GENERAL MEDICAL EXAMINATION AT A HEALTH CARE FACILITY: Primary | ICD-10-CM

## 2019-03-29 DIAGNOSIS — F33.42 RECURRENT MAJOR DEPRESSIVE DISORDER, IN FULL REMISSION: ICD-10-CM

## 2019-03-29 DIAGNOSIS — E66.01 MORBID OBESITY: ICD-10-CM

## 2019-03-29 PROCEDURE — 20610 PR DRAIN/INJECT LARGE JOINT/BURSA: ICD-10-PCS | Mod: S$GLB,,, | Performed by: INTERNAL MEDICINE

## 2019-03-29 PROCEDURE — 90715 TDAP VACCINE GREATER THAN OR EQUAL TO 7YO IM: ICD-10-PCS | Mod: S$GLB,,, | Performed by: INTERNAL MEDICINE

## 2019-03-29 PROCEDURE — 20610 DRAIN/INJ JOINT/BURSA W/O US: CPT | Mod: S$GLB,,, | Performed by: INTERNAL MEDICINE

## 2019-03-29 PROCEDURE — 86803 HEPATITIS C AB TEST: CPT | Mod: PO

## 2019-03-29 PROCEDURE — 90472 IMMUNIZATION ADMIN EACH ADD: CPT | Mod: S$GLB,,, | Performed by: INTERNAL MEDICINE

## 2019-03-29 PROCEDURE — 90472 PNEUMOCOCCAL POLYSACCHARIDE VACCINE 23-VALENT =>2YO SQ IM: ICD-10-PCS | Mod: S$GLB,,, | Performed by: INTERNAL MEDICINE

## 2019-03-29 PROCEDURE — 99396 PREV VISIT EST AGE 40-64: CPT | Mod: 25,S$GLB,, | Performed by: INTERNAL MEDICINE

## 2019-03-29 PROCEDURE — 90732 PNEUMOCOCCAL POLYSACCHARIDE VACCINE 23-VALENT =>2YO SQ IM: ICD-10-PCS | Mod: S$GLB,,, | Performed by: INTERNAL MEDICINE

## 2019-03-29 PROCEDURE — 90715 TDAP VACCINE 7 YRS/> IM: CPT | Mod: S$GLB,,, | Performed by: INTERNAL MEDICINE

## 2019-03-29 PROCEDURE — 90732 PPSV23 VACC 2 YRS+ SUBQ/IM: CPT | Mod: S$GLB,,, | Performed by: INTERNAL MEDICINE

## 2019-03-29 PROCEDURE — 99396 PR PREVENTIVE VISIT,EST,40-64: ICD-10-PCS | Mod: 25,S$GLB,, | Performed by: INTERNAL MEDICINE

## 2019-03-29 PROCEDURE — 99999 PR PBB SHADOW E&M-EST. PATIENT-LVL III: ICD-10-PCS | Mod: PBBFAC,,, | Performed by: INTERNAL MEDICINE

## 2019-03-29 PROCEDURE — 90471 IMMUNIZATION ADMIN: CPT | Mod: S$GLB,,, | Performed by: INTERNAL MEDICINE

## 2019-03-29 PROCEDURE — 99999 PR PBB SHADOW E&M-EST. PATIENT-LVL III: CPT | Mod: PBBFAC,,, | Performed by: INTERNAL MEDICINE

## 2019-03-29 PROCEDURE — 90471 TDAP VACCINE GREATER THAN OR EQUAL TO 7YO IM: ICD-10-PCS | Mod: S$GLB,,, | Performed by: INTERNAL MEDICINE

## 2019-03-29 PROCEDURE — 36415 COLL VENOUS BLD VENIPUNCTURE: CPT | Mod: PO

## 2019-03-29 NOTE — PROGRESS NOTES
Subjective:       Patient ID: Luz Duke is a 58 y.o. female.    Chief Complaint: Hip Pain (Started on Wednesday; left side.)    HPI  Wellness check.  Pt c/o 2 days of L LBP.  No radicular sxs.  No trauma or awkward movements.  No current depression.  Review of Systems   All other systems reviewed and are negative.      Objective:      Physical Exam   Constitutional: She appears well-developed. No distress.   obese   HENT:   Head: Normocephalic.   Eyes: EOM are normal.   Neck: Normal range of motion. No tracheal deviation present.   Cardiovascular: Normal rate, regular rhythm, normal heart sounds and intact distal pulses.   Pulmonary/Chest: Effort normal and breath sounds normal. No respiratory distress.   Abdominal: Soft. Bowel sounds are normal. She exhibits no distension. There is no tenderness.   Musculoskeletal: Normal range of motion. She exhibits tenderness (L SI joint). She exhibits no edema.   Neurological: She is alert. No cranial nerve deficit. She exhibits normal muscle tone. Coordination normal.   Skin: Skin is warm and dry. No rash noted. She is not diaphoretic. No erythema.   Psychiatric: She has a normal mood and affect. Her behavior is normal.   Vitals reviewed.      Assessment:       1. Routine general medical examination at a health care facility    2. Morbid obesity    3. Recurrent major depressive disorder, in full remission    4. Sacroiliitis        Plan:       Luz was seen today for hip pain.    Diagnoses and all orders for this visit:    Routine general medical examination at a health care facility  -     Tdap Vaccine  -     Pneumococcal Polysaccharide Vaccine (23 Valent) (SQ/IM)  -     Hepatitis C antibody; Future    Morbid obesity   Monitor    Recurrent major depressive disorder, in full remission   Well-cont    Sacroiliitis   Injected 2 cc's Kenalog into L SI joint NDC# 4178-4751-1    Follow up if symptoms worsen or fail to improve.

## 2019-04-01 LAB — HCV AB SERPL QL IA: NEGATIVE

## 2019-05-11 DIAGNOSIS — R60.0 LEG EDEMA, LEFT: ICD-10-CM

## 2019-05-13 RX ORDER — SPIRONOLACTONE 50 MG/1
50 TABLET, FILM COATED ORAL DAILY
Qty: 30 TABLET | Refills: 0 | Status: SHIPPED | OUTPATIENT
Start: 2019-05-13 | End: 2019-06-04 | Stop reason: SDUPTHER

## 2019-06-04 DIAGNOSIS — R60.0 LEG EDEMA, LEFT: ICD-10-CM

## 2019-06-04 RX ORDER — SPIRONOLACTONE 50 MG/1
50 TABLET, FILM COATED ORAL DAILY
Qty: 30 TABLET | Refills: 0 | Status: SHIPPED | OUTPATIENT
Start: 2019-06-04 | End: 2019-07-01 | Stop reason: SDUPTHER

## 2019-06-10 DIAGNOSIS — R60.0 LEG EDEMA, LEFT: ICD-10-CM

## 2019-06-10 RX ORDER — SPIRONOLACTONE 50 MG/1
50 TABLET, FILM COATED ORAL DAILY
Qty: 30 TABLET | Refills: 0 | Status: SHIPPED | OUTPATIENT
Start: 2019-06-10 | End: 2019-08-13 | Stop reason: SDUPTHER

## 2019-07-01 DIAGNOSIS — R60.0 LEG EDEMA, LEFT: ICD-10-CM

## 2019-07-01 RX ORDER — SPIRONOLACTONE 50 MG/1
50 TABLET, FILM COATED ORAL DAILY
Qty: 30 TABLET | Refills: 0 | Status: SHIPPED | OUTPATIENT
Start: 2019-07-01 | End: 2019-07-27 | Stop reason: SDUPTHER

## 2019-07-27 ENCOUNTER — TELEPHONE (OUTPATIENT)
Dept: INTERNAL MEDICINE | Facility: CLINIC | Age: 59
End: 2019-07-27

## 2019-07-27 DIAGNOSIS — R60.0 LEG EDEMA, LEFT: ICD-10-CM

## 2019-07-29 ENCOUNTER — TELEPHONE (OUTPATIENT)
Dept: FAMILY MEDICINE | Facility: CLINIC | Age: 59
End: 2019-07-29

## 2019-07-29 RX ORDER — SPIRONOLACTONE 50 MG/1
50 TABLET, FILM COATED ORAL DAILY
Qty: 30 TABLET | Refills: 0 | Status: SHIPPED | OUTPATIENT
Start: 2019-07-29 | End: 2019-11-25 | Stop reason: SDUPTHER

## 2019-07-29 NOTE — TELEPHONE ENCOUNTER
----- Message from Luly Matthews sent at 7/29/2019  2:25 PM CDT -----  Contact: Self/ [phone]  Patient called in requesting to speak with you. Patient prefers to speak with a nurse. Please call and advise.

## 2019-08-13 ENCOUNTER — OFFICE VISIT (OUTPATIENT)
Dept: INTERNAL MEDICINE | Facility: CLINIC | Age: 59
End: 2019-08-13
Payer: COMMERCIAL

## 2019-08-13 VITALS
SYSTOLIC BLOOD PRESSURE: 124 MMHG | HEART RATE: 78 BPM | BODY MASS INDEX: 41.57 KG/M2 | HEIGHT: 64 IN | DIASTOLIC BLOOD PRESSURE: 76 MMHG | WEIGHT: 243.5 LBS

## 2019-08-13 DIAGNOSIS — G47.20 SLEEP STAGE DYSFUNCTION: Primary | ICD-10-CM

## 2019-08-13 DIAGNOSIS — R60.0 EDEMA OF LEFT LOWER EXTREMITY: ICD-10-CM

## 2019-08-13 DIAGNOSIS — Z13.6 SCREENING FOR CARDIOVASCULAR CONDITION: ICD-10-CM

## 2019-08-13 PROCEDURE — 3008F PR BODY MASS INDEX (BMI) DOCUMENTED: ICD-10-PCS | Mod: CPTII,S$GLB,, | Performed by: INTERNAL MEDICINE

## 2019-08-13 PROCEDURE — 99213 OFFICE O/P EST LOW 20 MIN: CPT | Mod: S$GLB,,, | Performed by: INTERNAL MEDICINE

## 2019-08-13 PROCEDURE — 99999 PR PBB SHADOW E&M-EST. PATIENT-LVL III: ICD-10-PCS | Mod: PBBFAC,,, | Performed by: INTERNAL MEDICINE

## 2019-08-13 PROCEDURE — 99213 PR OFFICE/OUTPT VISIT, EST, LEVL III, 20-29 MIN: ICD-10-PCS | Mod: S$GLB,,, | Performed by: INTERNAL MEDICINE

## 2019-08-13 PROCEDURE — 3008F BODY MASS INDEX DOCD: CPT | Mod: CPTII,S$GLB,, | Performed by: INTERNAL MEDICINE

## 2019-08-13 PROCEDURE — 99999 PR PBB SHADOW E&M-EST. PATIENT-LVL III: CPT | Mod: PBBFAC,,, | Performed by: INTERNAL MEDICINE

## 2019-08-13 RX ORDER — ZOLPIDEM TARTRATE 5 MG/1
5 TABLET ORAL NIGHTLY PRN
Qty: 30 TABLET | Refills: 2 | Status: SHIPPED | OUTPATIENT
Start: 2019-08-13 | End: 2020-02-13

## 2019-08-18 NOTE — PROGRESS NOTES
"INTERNAL MEDICINE    Patient Active Problem List   Diagnosis    GERD without esophagitis    Insomnia, unspecified    SIMONA (obstructive sleep apnea)    Sleep stage dysfunction    Edema of left lower extremity    Osteopenia after menopause    Morbid obesity    Recurrent major depressive disorder, in full remission       CC:   Chief Complaint   Patient presents with    Other     check up       SUBJECTIVE:  Luz Duke   is a 58 y.o. female  HPI     58y/oWF, last seen by Dr Willoughby, 3/29/19 for a "routine general medical examination". Has not seen me since 18.  She complains of sleep disturbance, awakens and cannot get back to sleep.  Known to have SIMONA, uses mask, but still has difficulty.  Reviewed with her diet, no caffeine, types of foods, nothing within 4 hrs of recumbency, dark room, no stimuli prior to sleep.            ROS: Review of Systems   Constitutional: Negative.    HENT: Negative.    Eyes: Negative for pain and visual disturbance.   Respiratory: Negative.    Cardiovascular: Negative.    Gastrointestinal: Negative.    Endocrine: Negative.    Genitourinary: Negative.    Musculoskeletal: Negative.    Skin: Negative.    Neurological: Negative.    Hematological: Negative.    Psychiatric/Behavioral: Positive for sleep disturbance.       Past Medical History:   Diagnosis Date    Breast cancer 2005    chemo/lumpectomy     Depression     Encounter for blood transfusion     GERD (gastroesophageal reflux disease)        Past Surgical History:   Procedure Laterality Date    BREAST SURGERY Left 2005    lumpectomy,chemo and radiation    BUNIONECTOMY Right 2016     SECTION      FOOT SURGERY Left     GASTRIC BYPASS  2004    TUBAL LIGATION         Family History   Problem Relation Age of Onset    Cancer Father     Cancer Paternal Aunt     Cancer Paternal Uncle        Social History     Tobacco Use    Smoking status: Never Smoker    Smokeless tobacco: Never Used   Substance Use Topics " "   Alcohol use: Yes     Alcohol/week: 1.2 oz     Types: 1 Cans of beer, 1 Glasses of wine per week     Comment: occ    Drug use: No       Social History     Social History Narrative    Not on file       ALLERGIES: Review of patient's allergies indicates:  No Known Allergies    MEDS:   Current Outpatient Medications:     azelastine (ASTELIN) 137 mcg (0.1 %) nasal spray, , Disp: , Rfl: 0    CALCIUM CARBONATE/VITAMIN D3 (CALTRATE WITH VITAMIN D3 ORAL), Take 1 tablet by mouth 2 (two) times daily., Disp: , Rfl:     celecoxib (CELEBREX) 200 MG capsule, Take 200 mg by mouth once daily., Disp: , Rfl:     diphenoxylate-atropine 2.5-0.025 mg (LOMOTIL) 2.5-0.025 mg per tablet, Take 1 tablet by mouth as needed. , Disp: , Rfl: 5    escitalopram oxalate (LEXAPRO) 10 MG tablet, Take 10 mg by mouth once daily., Disp: , Rfl:     LORATADINE ORAL, Take by mouth., Disp: , Rfl:     multivitamin (ONE DAILY MULTIVITAMIN) per tablet, Take 1 tablet by mouth once daily., Disp: , Rfl:     nystatin (MYCOSTATIN) cream, Apply twice a day, Disp: 15 g, Rfl: 3    PROAIR HFA 90 mcg/actuation inhaler, as needed., Disp: , Rfl: 0    raloxifene (EVISTA) 60 mg tablet, Take 60 mg by mouth once daily., Disp: , Rfl:     spironolactone (ALDACTONE) 50 MG tablet, TAKE 1 TABLET (50 MG TOTAL) BY MOUTH ONCE DAILY., Disp: 30 tablet, Rfl: 0    sucralfate (CARAFATE) 1 gram tablet, Take 1 g by mouth 2 (two) times daily., Disp: , Rfl:     zolpidem (AMBIEN) 5 MG Tab, Take 1 tablet (5 mg total) by mouth nightly as needed., Disp: 30 tablet, Rfl: 2    multivit-min-FA-lycopen-lutein 300-600-300 mcg Tab, Take by mouth., Disp: , Rfl:     OBJECTIVE:   Vitals:    08/13/19 1130   BP: 124/76   Pulse: 78   Weight: 110.5 kg (243 lb 8 oz)   Height: 5' 4" (1.626 m)     Body mass index is 41.8 kg/m².    Physical Exam   Constitutional: She is oriented to person, place, and time. She appears well-developed and well-nourished.   HENT:   Head: Normocephalic and " atraumatic.   Right Ear: External ear normal.   Left Ear: External ear normal.   Nose: Nose normal.   Mouth/Throat: Oropharynx is clear and moist.   Eyes: Pupils are equal, round, and reactive to light. Conjunctivae and EOM are normal.   Neck: Normal range of motion. Neck supple.   Cardiovascular: Normal rate, regular rhythm and normal heart sounds.   Pulmonary/Chest: Effort normal and breath sounds normal.   Abdominal: Soft. Bowel sounds are normal.   Musculoskeletal: Normal range of motion.   Neurological: She is alert and oriented to person, place, and time.   Skin: Skin is warm and dry.   Psychiatric: She has a normal mood and affect. Her behavior is normal. Judgment and thought content normal.   Nursing note and vitals reviewed.        PERTINENT RESULTS:   CBC:  Recent Labs   Lab Result Units 08/14/19  0816   WBC K/uL 7.71   RBC M/uL 4.34   Hemoglobin g/dL 12.6   Hematocrit % 38.2   Platelets K/uL 256   Mean Corpuscular Volume fL 88   Mean Corpuscular Hemoglobin pg 29.0   Mean Corpuscular Hemoglobin Conc g/dL 33.0     CMP:  Recent Labs   Lab Result Units 08/14/19  0816   Glucose mg/dL 83   Calcium mg/dL 8.8   Albumin g/dL 3.7   Total Protein g/dL 6.3   Sodium mmol/L 139   Potassium mmol/L 4.1   CO2 mmol/L 26   Chloride mmol/L 108   BUN, Bld mg/dL 19*   Alkaline Phosphatase U/L 71   ALT U/L 18   AST U/L 21   Total Bilirubin mg/dL 0.6     URINALYSIS:  Recent Labs   Lab Result Units 08/14/19  0829   Color, UA  Yellow   Specific Gravity, UA  1.020   pH, UA  7.0   Protein, UA  Negative   Nitrite, UA  Negative   Leukocytes, UA  Negative   Urobilinogen, UA EU/dL Negative      LIPIDS:  Recent Labs   Lab Result Units 08/14/19  0816   HDL mg/dL 58   Cholesterol mg/dL 158   Triglycerides mg/dL 52   LDL Cholesterol mg/dL 89.6   Hdl/Cholesterol Ratio % 36.7   Non-HDL Cholesterol mg/dL 100   Total Cholesterol/HDL Ratio  2.7             ASSESSMENT:  Problem List Items Addressed This Visit        Other    Sleep stage  dysfunction - Primary    Relevant Medications    zolpidem (AMBIEN) 5 MG Tab    Edema of left lower extremity    Relevant Orders    CBC auto differential (Completed)    Comprehensive metabolic panel (Completed)    Urinalysis (Completed)      Other Visit Diagnoses     Screening for cardiovascular condition        Relevant Orders    Lipid panel (Completed)          PLAN:   Orders Placed This Encounter    CBC auto differential    Comprehensive metabolic panel    Lipid panel    Urinalysis    zolpidem (AMBIEN) 5 MG Tab     Orders Placed This Encounter   Procedures    CBC auto differential     Standing Status:   Future     Number of Occurrences:   1     Standing Expiration Date:   8/12/2020    Comprehensive metabolic panel     Standing Status:   Future     Number of Occurrences:   1     Standing Expiration Date:   8/12/2020    Lipid panel     Standing Status:   Future     Number of Occurrences:   1     Standing Expiration Date:   8/12/2020    Urinalysis     Standing Status:   Future     Number of Occurrences:   1     Standing Expiration Date:   8/12/2020       Follow-up with me in 6months.   Dr. Sam Dover  Internal Medicine

## 2019-10-16 ENCOUNTER — OFFICE VISIT (OUTPATIENT)
Dept: URGENT CARE | Facility: CLINIC | Age: 59
End: 2019-10-16
Payer: COMMERCIAL

## 2019-10-16 VITALS
TEMPERATURE: 99 F | OXYGEN SATURATION: 96 % | RESPIRATION RATE: 16 BRPM | WEIGHT: 243 LBS | DIASTOLIC BLOOD PRESSURE: 59 MMHG | HEART RATE: 78 BPM | SYSTOLIC BLOOD PRESSURE: 115 MMHG | HEIGHT: 64 IN | BODY MASS INDEX: 41.48 KG/M2

## 2019-10-16 DIAGNOSIS — R05.9 COUGH: ICD-10-CM

## 2019-10-16 DIAGNOSIS — H66.93 ACUTE OTITIS MEDIA, BILATERAL: Primary | ICD-10-CM

## 2019-10-16 DIAGNOSIS — R52 BODY ACHES: ICD-10-CM

## 2019-10-16 DIAGNOSIS — R50.9 FEVER IN ADULT: ICD-10-CM

## 2019-10-16 LAB
CTP QC/QA: YES
FLUAV AG NPH QL: NEGATIVE
FLUBV AG NPH QL: NEGATIVE

## 2019-10-16 PROCEDURE — 3008F PR BODY MASS INDEX (BMI) DOCUMENTED: ICD-10-PCS | Mod: CPTII,S$GLB,, | Performed by: NURSE PRACTITIONER

## 2019-10-16 PROCEDURE — 87804 INFLUENZA ASSAY W/OPTIC: CPT | Mod: QW,S$GLB,, | Performed by: NURSE PRACTITIONER

## 2019-10-16 PROCEDURE — 99214 PR OFFICE/OUTPT VISIT, EST, LEVL IV, 30-39 MIN: ICD-10-PCS | Mod: S$GLB,,, | Performed by: NURSE PRACTITIONER

## 2019-10-16 PROCEDURE — 99214 OFFICE O/P EST MOD 30 MIN: CPT | Mod: S$GLB,,, | Performed by: NURSE PRACTITIONER

## 2019-10-16 PROCEDURE — 3008F BODY MASS INDEX DOCD: CPT | Mod: CPTII,S$GLB,, | Performed by: NURSE PRACTITIONER

## 2019-10-16 PROCEDURE — 87804 POCT INFLUENZA A/B: ICD-10-PCS | Mod: 59,QW,S$GLB, | Performed by: NURSE PRACTITIONER

## 2019-10-16 RX ORDER — BENZONATATE 100 MG/1
100 CAPSULE ORAL 3 TIMES DAILY PRN
Qty: 20 CAPSULE | Refills: 0 | Status: SHIPPED | OUTPATIENT
Start: 2019-10-16 | End: 2019-10-23

## 2019-10-16 RX ORDER — PROMETHAZINE HYDROCHLORIDE AND DEXTROMETHORPHAN HYDROBROMIDE 6.25; 15 MG/5ML; MG/5ML
5 SYRUP ORAL EVERY 6 HOURS PRN
Qty: 100 ML | Refills: 0 | Status: SHIPPED | OUTPATIENT
Start: 2019-10-16 | End: 2019-10-23

## 2019-10-16 RX ORDER — CEFDINIR 300 MG/1
300 CAPSULE ORAL 2 TIMES DAILY
Qty: 20 CAPSULE | Refills: 0 | Status: SHIPPED | OUTPATIENT
Start: 2019-10-16 | End: 2019-10-26

## 2019-10-16 NOTE — PROGRESS NOTES
"Subjective:       Patient ID: Luz Duke is a 59 y.o. female.    Vitals:  height is 5' 4" (1.626 m) and weight is 110.2 kg (243 lb). Her oral temperature is 99.1 °F (37.3 °C). Her blood pressure is 115/59 (abnormal) and her pulse is 78. Her respiration is 16 and oxygen saturation is 96%.     Chief Complaint: Fever and Sinus Problem    Fever    This is a new problem. The current episode started yesterday. The problem occurs constantly. The problem has been gradually worsening. The maximum temperature noted was 100 to 100.9 F. The temperature was taken using an oral thermometer. Associated symptoms include coughing and ear pain. Pertinent negatives include no congestion, nausea, rash, sore throat, vomiting or wheezing. She has tried acetaminophen for the symptoms.   Sinus Problem   This is a new problem. The current episode started yesterday. The problem has been gradually worsening since onset. The maximum temperature recorded prior to her arrival was 100.4 - 100.9 F. The fever has been present for less than 1 day. Her pain is at a severity of 7/10. The pain is moderate. Associated symptoms include coughing, ear pain and sinus pressure. Pertinent negatives include no chills, congestion, diaphoresis, shortness of breath or sore throat. Past treatments include acetaminophen.       Constitution: Positive for fever. Negative for chills, sweating and fatigue.   HENT: Positive for ear pain, postnasal drip, sinus pain and sinus pressure. Negative for congestion, sore throat and voice change.    Neck: Negative for painful lymph nodes.   Eyes: Negative for eye redness.   Respiratory: Positive for cough. Negative for chest tightness, sputum production, bloody sputum, COPD, shortness of breath, stridor, wheezing and asthma.    Gastrointestinal: Negative for nausea and vomiting.   Musculoskeletal: Negative for muscle ache.   Skin: Negative for rash.   Allergic/Immunologic: Negative for seasonal allergies and asthma. "   Hematologic/Lymphatic: Negative for swollen lymph nodes.       Objective:      Physical Exam   Constitutional: She is oriented to person, place, and time. She appears well-developed and well-nourished. She is cooperative.  Non-toxic appearance. She does not have a sickly appearance. She does not appear ill. No distress.   HENT:   Head: Normocephalic and atraumatic.   Right Ear: Hearing, external ear and ear canal normal. Tympanic membrane is erythematous and bulging. Tympanic membrane mobility is abnormal. A middle ear effusion is present.   Left Ear: Hearing, external ear and ear canal normal. Tympanic membrane is erythematous and bulging. Tympanic membrane mobility is abnormal. A middle ear effusion is present.   Nose: Mucosal edema present. No rhinorrhea or nasal deformity. No epistaxis. Right sinus exhibits no maxillary sinus tenderness and no frontal sinus tenderness. Left sinus exhibits no maxillary sinus tenderness and no frontal sinus tenderness.   Mouth/Throat: Uvula is midline and mucous membranes are normal. No trismus in the jaw. Normal dentition. No uvula swelling. Posterior oropharyngeal erythema present. No oropharyngeal exudate or posterior oropharyngeal edema.   Eyes: Pupils are equal, round, and reactive to light. Conjunctivae and lids are normal. No scleral icterus.   Neck: Trachea normal, normal range of motion, full passive range of motion without pain and phonation normal. Neck supple. No neck rigidity. No edema and no erythema present.   Cardiovascular: Normal rate, regular rhythm, normal heart sounds, intact distal pulses and normal pulses.   Pulmonary/Chest: Effort normal and breath sounds normal. No respiratory distress. She has no decreased breath sounds. She has no rhonchi.   Abdominal: Normal appearance.   Musculoskeletal: Normal range of motion. She exhibits no edema or deformity.   Lymphadenopathy:     She has no cervical adenopathy.   Neurological: She is alert and oriented to  person, place, and time. She exhibits normal muscle tone. Coordination normal.   Skin: Skin is warm, dry, intact, not diaphoretic and not pale.   Psychiatric: She has a normal mood and affect. Her speech is normal and behavior is normal. Judgment and thought content normal. Cognition and memory are normal.   Nursing note and vitals reviewed.        Assessment:       1. Acute otitis media, bilateral    2. Cough    3. Body aches    4. Fever in adult        Plan:         Acute otitis media, bilateral  -     cefdinir (OMNICEF) 300 MG capsule; Take 1 capsule (300 mg total) by mouth 2 (two) times daily. for 10 days  Dispense: 20 capsule; Refill: 0    Cough  -     POCT Influenza A/B  -     benzonatate (TESSALON) 100 MG capsule; Take 1 capsule (100 mg total) by mouth 3 (three) times daily as needed for Cough.  Dispense: 20 capsule; Refill: 0  -     promethazine-dextromethorphan (PROMETHAZINE-DM) 6.25-15 mg/5 mL Syrp; Take 5 mLs by mouth every 6 (six) hours as needed (May take every 4 hours, PRN. DoNOT take more than 30 mL in 24 hours.).  Dispense: 100 mL; Refill: 0    Body aches    Fever in adult      Results for orders placed or performed in visit on 10/16/19   POCT Influenza A/B   Result Value Ref Range    Rapid Influenza A Ag Negative Negative    Rapid Influenza B Ag Negative Negative     Acceptable Yes      Patient Instructions   Always follow your healthcare professional's instructions.    You have received urgent care diagnosis and treatment and you may be released before all of your medical problems are known or treated. Unless you have been given a referral, you (the patient), will arrange for follow-up care as instructed.     If you have been given a referral, [facility] will contact you (their phone number is [phone]). If they do NOT call you by the end of the business day, please call them the following day.    Please follow up with your primary care provider within 5-7 days if your signs and  symptoms have not resolved or worsen.     If your condition worsens or fails to improve we recommend that you receive another evaluation at the emergency room immediately or contact your primary care provider to discuss your concerns.       Middle Ear Infection (Adult)  You have an infection of the middle ear, the space behind the eardrum. This is also called acute otitis media (AOM). Sometimes it is caused by the common cold. This is because congestion can block the internal passage (eustachian tube) that drains fluid from the middle ear. When the middle ear fills with fluid, bacteria can grow there and cause an infection. Oral antibiotics are used to treat this illness, not ear drops. Symptoms usually start to improve within 1 to 2 days of treatment.    Home care  The following are general care guidelines:  · Finish all of the antibiotic medicine given, even though you may feel better after the first few days.  · You may use over-the-counter medicine, such as acetaminophen or ibuprofen, to control pain and fever, unless something else was prescribed. If you have chronic liver or kidney disease or have ever had a stomach ulcer or gastrointestinal bleeding, talk with your healthcare provider before using these medicines. Do not give aspirin to anyone under 18 years of age who has a fever. It may cause severe illness or death.  Follow-up care  Follow up with your healthcare provider, or as advised, in 2 weeks if all symptoms have not gotten better, or if hearing doesn't go back to normal within 1 month.  When to seek medical advice  Call your healthcare provider right away if any of these occur:  · Ear pain gets worse or does not improve after 3 days of treatment  · Unusual drowsiness or confusion  · Neck pain, stiff neck, or headache  · Fluid or blood draining from the ear canal  · Fever of 100.4°F (38°C) or as advised   · Seizure  Date Last Reviewed: 6/1/2016  © 1953-6132 [employer]. [address] [place] [address]  [place] [address]. All rights reserved. This information is not intended as a substitute for professional medical care. Always follow your healthcare professional's instructions.        You have received urgent care diagnosis and treatment and you may be released before all of your medical problems are known or treated. Unless you have been given a referral, you (the patient), will arrange for follow-up care as instructed. If you have been given a referral,  will contact you (there phone number is [phone])    Take the following over-the-counter medications: Claritin, zyrtec, allegra, OR xyzal as directed, Flonase as directed for sinus congestion and postnasal drip; and If you can tolerate Benadryl at night time, this will help you to sleep and will work to dry up secretions.    Your provider discussed your plan of care with you during your physical exam. It was reviewed once more by the provider when giving you an after visit summary. If the patient is a minor, the discharge instructions were discussed with an adult and that adult acknowledge their understanding of the provider's teaching.

## 2019-10-16 NOTE — PATIENT INSTRUCTIONS
Always follow your healthcare professional's instructions.    You have received urgent care diagnosis and treatment and you may be released before all of your medical problems are known or treated. Unless you have been given a referral, you (the patient), will arrange for follow-up care as instructed.     If you have been given a referral, [facility] will contact you (their phone number is [phone]). If they do NOT call you by the end of the business day, please call them the following day.    Please follow up with your primary care provider within 5-7 days if your signs and symptoms have not resolved or worsen.     If your condition worsens or fails to improve we recommend that you receive another evaluation at the emergency room immediately or contact your primary care provider to discuss your concerns.       Middle Ear Infection (Adult)  You have an infection of the middle ear, the space behind the eardrum. This is also called acute otitis media (AOM). Sometimes it is caused by the common cold. This is because congestion can block the internal passage (eustachian tube) that drains fluid from the middle ear. When the middle ear fills with fluid, bacteria can grow there and cause an infection. Oral antibiotics are used to treat this illness, not ear drops. Symptoms usually start to improve within 1 to 2 days of treatment.    Home care  The following are general care guidelines:  · Finish all of the antibiotic medicine given, even though you may feel better after the first few days.  · You may use over-the-counter medicine, such as acetaminophen or ibuprofen, to control pain and fever, unless something else was prescribed. If you have chronic liver or kidney disease or have ever had a stomach ulcer or gastrointestinal bleeding, talk with your healthcare provider before using these medicines. Do not give aspirin to anyone under 18 years of age who has a fever. It may cause severe illness or death.  Follow-up  care  Follow up with your healthcare provider, or as advised, in 2 weeks if all symptoms have not gotten better, or if hearing doesn't go back to normal within 1 month.  When to seek medical advice  Call your healthcare provider right away if any of these occur:  · Ear pain gets worse or does not improve after 3 days of treatment  · Unusual drowsiness or confusion  · Neck pain, stiff neck, or headache  · Fluid or blood draining from the ear canal  · Fever of 100.4°F (38°C) or as advised   · Seizure  Date Last Reviewed: 6/1/2016 © 2000-2017 [employer]. [address] [place] [address]. All rights reserved. This information is not intended as a substitute for professional medical care. Always follow your healthcare professional's instructions.        You have received urgent care diagnosis and treatment and you may be released before all of your medical problems are known or treated. Unless you have been given a referral, you (the patient), will arrange for follow-up care as instructed. If you have been given a referral, [facility] will contact you (there phone number is [phone])    Take the following over-the-counter medications: Claritin, zyrtec, allegra, OR xyzal as directed, Flonase as directed for sinus congestion and postnasal drip; and If you can tolerate Benadryl at night time, this will help you to sleep and will work to dry up secretions.    Your provider discussed your plan of care with you during your physical exam. It was reviewed once more by the provider when giving you an after visit summary. If the patient is a minor, the discharge instructions were discussed with an adult and that adult acknowledge their understanding of the provider's teaching.

## 2019-10-19 ENCOUNTER — TELEPHONE (OUTPATIENT)
Dept: URGENT CARE | Facility: CLINIC | Age: 59
End: 2019-10-19

## 2019-11-25 DIAGNOSIS — R60.0 LEG EDEMA, LEFT: ICD-10-CM

## 2019-11-25 RX ORDER — SPIRONOLACTONE 50 MG/1
50 TABLET, FILM COATED ORAL DAILY
Qty: 90 TABLET | Refills: 1 | Status: SHIPPED | OUTPATIENT
Start: 2019-11-25 | End: 2020-02-13 | Stop reason: SDUPTHER

## 2019-11-25 NOTE — TELEPHONE ENCOUNTER
----- Message from Neida Amaral sent at 11/25/2019  9:45 AM CST -----  Contact: Pt    Pt is requesting a 90 day refill on her medication     spironolactone (ALDACTONE) 50 MG tabletspironolactone (ALDACTONE) 50 MG tablet    [facility] - [address], [place]

## 2020-01-16 ENCOUNTER — TELEPHONE (OUTPATIENT)
Dept: FAMILY MEDICINE | Facility: CLINIC | Age: 60
End: 2020-01-16

## 2020-01-16 DIAGNOSIS — Z00.01 ENCOUNTER FOR GENERAL ADULT MEDICAL EXAMINATION WITH ABNORMAL FINDINGS: Primary | ICD-10-CM

## 2020-01-16 NOTE — TELEPHONE ENCOUNTER
Patient is scheduled to est care with you on 2/13/2020. Would you like to order labs for patient to have prior to visit? Please advise.

## 2020-01-16 NOTE — TELEPHONE ENCOUNTER
----- Message from Cecille Scott sent at 1/16/2020 11:52 AM CST -----  Contact: [phone]/self  Patient is requesting orders for Annual lab work sent to [facility]. Thanks

## 2020-01-30 ENCOUNTER — PATIENT OUTREACH (OUTPATIENT)
Dept: ADMINISTRATIVE | Facility: HOSPITAL | Age: 60
End: 2020-01-30

## 2020-02-11 PROBLEM — M15.0 PRIMARY OSTEOARTHRITIS INVOLVING MULTIPLE JOINTS: Status: ACTIVE | Noted: 2020-02-11

## 2020-02-11 PROBLEM — G47.09 OTHER INSOMNIA: Status: ACTIVE | Noted: 2018-05-25

## 2020-02-11 PROBLEM — R60.0 LOCALIZED EDEMA: Status: ACTIVE | Noted: 2020-02-11

## 2020-02-11 PROBLEM — E66.813 CLASS 3 SEVERE OBESITY DUE TO EXCESS CALORIES WITH SERIOUS COMORBIDITY AND BODY MASS INDEX (BMI) OF 40.0 TO 44.9 IN ADULT: Status: ACTIVE | Noted: 2019-03-29

## 2020-02-11 PROBLEM — M15.9 PRIMARY OSTEOARTHRITIS INVOLVING MULTIPLE JOINTS: Status: ACTIVE | Noted: 2020-02-11

## 2020-02-11 PROBLEM — R60.0 EDEMA OF LEFT LOWER EXTREMITY: Status: RESOLVED | Noted: 2018-12-26 | Resolved: 2020-02-11

## 2020-02-11 NOTE — PROGRESS NOTES
FAMILY MEDICINE    Patient Active Problem List   Diagnosis    GERD without esophagitis    Other insomnia    SIMONA (obstructive sleep apnea)    Osteopenia of multiple sites    Class 3 severe obesity due to excess calories with serious comorbidity and body mass index (BMI) of 40.0 to 44.9 in adult    Recurrent major depressive disorder, in full remission    Primary osteoarthritis involving multiple joints    Localized edema    History of breast cancer       CC:   Chief Complaint   Patient presents with    [facility]       HPI: Luz Duke is a 59 y.o. female  - with history of left breast cancer (2005 s/p lumpectomy, chemotherapy and radiation), osteopenia, depression, GERD, SIMONA, osteoarthritis and chronic left lower leg swelling (s/p recurrent cellulitis) presents to establish care. Last PCP Dr. Dover     Onocology Dr. Beto Vazquez ([place])  Gynecology Dr. Renetta Rm ([facility])  - reports last Mammogram and Dexa 10/2019    1. Left lower extremity edema    Pt reports that started several years ago and she attributes it to a sprained knee and ankle. Reports that swelling never resolved s/p several bouts of cellulitis left lower leg. She has has LE dopplers in the past and no VTE. She denies any recent cellulitis    Current treatment:   Knee high compression  Spironolactone 50 mg daily        HEALTH MAINTENANCE:   Health Maintenance   Topic Date Due    Pneumococcal Vaccine (Highest Risk) (3 of 3 - PCV13) 03/29/2020    Pap Smear with HPV Cotest  08/22/2020    Mammogram  09/14/2020    Colonoscopy  01/12/2025    Lipid Panel  02/10/2025    TETANUS VACCINE  03/29/2029    Hepatitis C Screening  Completed       ROS: Review of Systems   Constitutional: Negative.    HENT: Negative.    Eyes: Negative.    Respiratory: Negative.    Cardiovascular: Positive for leg swelling (chronic left leg).        Otherwise negative   Gastrointestinal: Negative.    Endocrine: Negative.    Genitourinary: Negative.     Musculoskeletal: Negative.    Skin: Negative.    Allergic/Immunologic: Negative.    Neurological: Negative.    Hematological: Negative.    Psychiatric/Behavioral: Negative.        ALLERGIES:   Review of patient's allergies indicates:  No Known Allergies    MEDS:     Current Outpatient Medications:     alendronate (FOSAMAX) 70 MG tablet, Take 70 mg by mouth every 7 days., Disp: , Rfl:     azelastine (ASTELIN) 137 mcg (0.1 %) nasal spray, , Disp: , Rfl: 0    biotin 1 mg Cap, Take by mouth., Disp: , Rfl:     CALCIUM CARBONATE/VITAMIN D3 (CALTRATE WITH VITAMIN D3 ORAL), Take 1 tablet by mouth 2 (two) times daily., Disp: , Rfl:     celecoxib (CELEBREX) 200 MG capsule, Take 200 mg by mouth once daily., Disp: , Rfl:     cholecalciferol, vitamin D3, (VITAMIN D3) 125 mcg (5,000 unit) Tab, Take 5,000 Units by mouth once daily., Disp: , Rfl:     diphenhydrAMINE-acetaminophen (TYLENOL PM)  mg Tab, Take 1 tablet by mouth nightly as needed., Disp: , Rfl:     diphenoxylate-atropine 2.5-0.025 mg (LOMOTIL) 2.5-0.025 mg per tablet, Take 1 tablet by mouth as needed. , Disp: , Rfl: 5    escitalopram oxalate (LEXAPRO) 10 MG tablet, Take 10 mg by mouth once daily., Disp: , Rfl:     LORATADINE ORAL, Take by mouth., Disp: , Rfl:     multivitamin (ONE DAILY MULTIVITAMIN) per tablet, Take 1 tablet by mouth once daily., Disp: , Rfl:     PROAIR HFA 90 mcg/actuation inhaler, as needed., Disp: , Rfl: 0    raloxifene (EVISTA) 60 mg tablet, Take 60 mg by mouth once daily., Disp: , Rfl:     spironolactone (ALDACTONE) 50 MG tablet, Take 1 tablet (50 mg total) by mouth once daily., Disp: 90 tablet, Rfl: 3    sucralfate (CARAFATE) 1 gram tablet, Take 1 g by mouth 2 (two) times daily., Disp: , Rfl:     Past Medical History:   Diagnosis Date    Breast cancer 2005    chemo/lumpectomy     Depression     Encounter for blood transfusion     GERD (gastroesophageal reflux disease)        Past Surgical History:   Procedure Laterality  "Date    BREAST SURGERY Left 2005    lumpectomy,chemo and radiation    BUNIONECTOMY Right 2016     SECTION      FOOT SURGERY Left     GASTRIC BYPASS  2004    TUBAL LIGATION         Family History   Problem Relation Age of Onset    Cancer Father     Cancer Paternal Aunt     Cancer Paternal Uncle        Social History     Tobacco Use    Smoking status: Never Smoker    Smokeless tobacco: Never Used   Substance Use Topics    Alcohol use: Yes     Alcohol/week: 2.0 standard drinks     Types: 1 Cans of beer, 1 Glasses of wine per week     Comment: occ    Drug use: No       Social History     Social History Narrative    Not on file       OBJECTIVE:   Vitals:    20 0808   BP: 110/60   BP Location: Left arm   Patient Position: Sitting   BP Method: X-Large (Manual)   Pulse: 71   Resp: 18   Temp: 98.7 °F (37.1 °C)   TempSrc: Oral   SpO2: 96%   Weight: 112.2 kg (247 lb 6.4 oz)   Height: 5' 4" (1.626 m)     Body mass index is 42.47 kg/m².    Physical Exam   Constitutional: No distress.   HENT:   Head: Normocephalic and atraumatic.   Eyes: Pupils are equal, round, and reactive to light. Conjunctivae and EOM are normal. No scleral icterus.   Neck: Normal range of motion. Neck supple.   Cardiovascular: Normal rate, regular rhythm, normal heart sounds and intact distal pulses. Exam reveals no gallop and no friction rub.   No murmur heard.  Pulmonary/Chest: Effort normal and breath sounds normal. She has no wheezes. She has no rales.   Abdominal: Soft. Bowel sounds are normal. She exhibits no distension. There is no tenderness.   Musculoskeletal: She exhibits edema (trace leg lower leg without induration, tenderness or erythema).   Neurological: She is alert.   Skin: Skin is warm. Capillary refill takes less than 2 seconds.         Depression Patient Health Questionnaire 2020 3/29/2019   Over the last two weeks how often have you been bothered by little interest or pleasure in doing things 0 0   Over " the last two weeks how often have you been bothered by feeling down, depressed or hopeless 0 0   PHQ-2 Total Score 0 0       PERTINENT RESULTS:   Lab Visit on 02/10/2020   Component Date Value Ref Range Status    Sodium 02/10/2020 142  136 - 145 mmol/L Final    Potassium 02/10/2020 4.6  3.5 - 5.1 mmol/L Final    Chloride 02/10/2020 107  95 - 110 mmol/L Final    CO2 02/10/2020 28  23 - 29 mmol/L Final    Glucose 02/10/2020 92  70 - 110 mg/dL Final    BUN, Bld 02/10/2020 19* 7 - 17 mg/dL Final    Creatinine 02/10/2020 0.54  0.50 - 1.40 mg/dL Final    Calcium 02/10/2020 8.6* 8.7 - 10.5 mg/dL Final    Total Protein 02/10/2020 6.4  6.0 - 8.4 g/dL Final    Albumin 02/10/2020 3.7  3.5 - 5.2 g/dL Final    Total Bilirubin 02/10/2020 0.3  0.1 - 1.0 mg/dL Final    Comment: For infants and newborns, interpretation of results should be based  on gestational age, weight and in agreement with clinical  observations.  Premature Infant recommended reference ranges:  Up to 24 hours.............<8.0 mg/dL  Up to 48 hours............<12.0 mg/dL  3-5 days..................<15.0 mg/dL  6-29 days.................<15.0 mg/dL      Alkaline Phosphatase 02/10/2020 86  38 - 126 U/L Final    AST 02/10/2020 27  15 - 46 U/L Final    ALT 02/10/2020 22  10 - 44 U/L Final    Anion Gap 02/10/2020 7* 8 - 16 mmol/L Final    eGFR if African American 02/10/2020 >60.0  >60 mL/min/1.73 m^2 Final    eGFR if non African American 02/10/2020 >60.0  >60 mL/min/1.73 m^2 Final    Comment: Calculation used to obtain the estimated glomerular filtration  rate (eGFR) is the CKD-EPI equation.       Cholesterol 02/10/2020 168  120 - 199 mg/dL Final    Comment: The National Cholesterol Education Program (NCEP) has set the  following guidelines (reference ranges) for Cholesterol:  Optimal.....................<200 mg/dL  Borderline High.............200-239 mg/dL  High........................> or = 240 mg/dL      Triglycerides 02/10/2020 67  30 - 150  mg/dL Final    Comment: The National Cholesterol Education Program (NCEP) has set the  following guidelines (reference values) for triglycerides:  Normal......................<150 mg/dL  Borderline High.............150-199 mg/dL  High........................200-499 mg/dL      HDL 02/10/2020 67  40 - 75 mg/dL Final    Comment: The National Cholesterol Education Program (NCEP) has set the  following guidelines (reference values) for HDL Cholesterol:  Low...............<40 mg/dL  Optimal...........>60 mg/dL      LDL Cholesterol 02/10/2020 87.6  63.0 - 159.0 mg/dL Final    Comment: The National Cholesterol Education Program (NCEP) has set the  following guidelines (reference values) for LDL Cholesterol:  Optimal.......................<130 mg/dL  Borderline High...............130-159 mg/dL  High..........................160-189 mg/dL  Very High.....................>190 mg/dL      Hdl/Cholesterol Ratio 02/10/2020 39.9  20.0 - 50.0 % Final    Total Cholesterol/HDL Ratio 02/10/2020 2.5  2.0 - 5.0 Final    Non-HDL Cholesterol 02/10/2020 101  mg/dL Final    Comment: Risk category and Non-HDL cholesterol goals:  Coronary heart disease (CHD)or equivalent (10-year risk of CHD >20%):  Non-HDL cholesterol goal     <130 mg/dL  Two or more CHD risk factors and 10-year risk of CHD <= 20%:  Non-HDL cholesterol goal     <160 mg/dL  0 to 1 CHD risk factor:  Non-HDL cholesterol goal     <190 mg/dL         ASSESSMENT/PLAN:  Problem List Items Addressed This Visit        Psychiatric    Recurrent major depressive disorder, in full remission    Current Assessment & Plan     - well controlled  - continue current medications            Oncology    History of breast cancer    Overview     - 2005  - left breast s/p chemo and radiation            Orthopedic    Osteopenia of multiple sites    Overview     - followed by Gynecology [facility] Dr. Renetta Rm  - last Dexa 10/2019 [facility]  - fall prevention  - weight bearing exercises like walking,  squats, stair and jogging if able  - over the counter vitamin D3 8902-0012 units daily  - dietary calcium 1200 mg per day with diet or supplements   - repeat bone density scan in 2-3 years           Relevant Medications    biotin 1 mg Cap    cholecalciferol, vitamin D3, (VITAMIN D3) 125 mcg (5,000 unit) Tab    alendronate (FOSAMAX) 70 MG tablet       Other    Class 3 severe obesity due to excess calories with serious comorbidity and body mass index (BMI) of 40.0 to 44.9 in adult    Overview     - discussed recommendation for diet, exercise and weight loss         Localized edema - Primary    Overview     - chronic left distal leg swelling since recurrent cellulitis  - recommend compression hose daily  - recommend trial off of diuretic or only use PRN and pt agreeable to trial          Relevant Medications    spironolactone (ALDACTONE) 50 MG tablet          ORDERS:   Orders Placed This Encounter    spironolactone (ALDACTONE) 50 MG tablet     Vaccines recommended: up to date  HIV screening: discussed recommendation and pt declined    Follow-up  in 12 months or sooner if any concerns.     Dr. Tatianna Cook D.O.   Family Medicine

## 2020-02-13 ENCOUNTER — OFFICE VISIT (OUTPATIENT)
Dept: FAMILY MEDICINE | Facility: CLINIC | Age: 60
End: 2020-02-13
Payer: COMMERCIAL

## 2020-02-13 ENCOUNTER — PATIENT OUTREACH (OUTPATIENT)
Dept: ADMINISTRATIVE | Facility: HOSPITAL | Age: 60
End: 2020-02-13

## 2020-02-13 VITALS
DIASTOLIC BLOOD PRESSURE: 60 MMHG | RESPIRATION RATE: 18 BRPM | OXYGEN SATURATION: 96 % | BODY MASS INDEX: 42.23 KG/M2 | TEMPERATURE: 99 F | WEIGHT: 247.38 LBS | HEIGHT: 64 IN | SYSTOLIC BLOOD PRESSURE: 110 MMHG | HEART RATE: 71 BPM

## 2020-02-13 DIAGNOSIS — F33.42 RECURRENT MAJOR DEPRESSIVE DISORDER, IN FULL REMISSION: ICD-10-CM

## 2020-02-13 DIAGNOSIS — E66.01 CLASS 3 SEVERE OBESITY DUE TO EXCESS CALORIES WITH SERIOUS COMORBIDITY AND BODY MASS INDEX (BMI) OF 40.0 TO 44.9 IN ADULT: ICD-10-CM

## 2020-02-13 DIAGNOSIS — Z85.3 HISTORY OF BREAST CANCER: ICD-10-CM

## 2020-02-13 DIAGNOSIS — M85.89 OSTEOPENIA OF MULTIPLE SITES: ICD-10-CM

## 2020-02-13 DIAGNOSIS — R60.0 LOCALIZED EDEMA: Primary | ICD-10-CM

## 2020-02-13 PROCEDURE — 3008F PR BODY MASS INDEX (BMI) DOCUMENTED: ICD-10-PCS | Mod: CPTII,S$GLB,, | Performed by: FAMILY MEDICINE

## 2020-02-13 PROCEDURE — 3008F BODY MASS INDEX DOCD: CPT | Mod: CPTII,S$GLB,, | Performed by: FAMILY MEDICINE

## 2020-02-13 PROCEDURE — 99214 PR OFFICE/OUTPT VISIT, EST, LEVL IV, 30-39 MIN: ICD-10-PCS | Mod: S$GLB,,, | Performed by: FAMILY MEDICINE

## 2020-02-13 PROCEDURE — 99999 PR PBB SHADOW E&M-EST. PATIENT-LVL IV: ICD-10-PCS | Mod: PBBFAC,,, | Performed by: FAMILY MEDICINE

## 2020-02-13 PROCEDURE — 99999 PR PBB SHADOW E&M-EST. PATIENT-LVL IV: CPT | Mod: PBBFAC,,, | Performed by: FAMILY MEDICINE

## 2020-02-13 PROCEDURE — 99214 OFFICE O/P EST MOD 30 MIN: CPT | Mod: S$GLB,,, | Performed by: FAMILY MEDICINE

## 2020-02-13 RX ORDER — NICOTINE POLACRILEX 2 MG
GUM BUCCAL
COMMUNITY
End: 2023-04-26

## 2020-02-13 RX ORDER — ALENDRONATE SODIUM 70 MG/1
70 TABLET ORAL
COMMUNITY
End: 2021-04-13

## 2020-02-13 RX ORDER — SPIRONOLACTONE 50 MG/1
50 TABLET, FILM COATED ORAL DAILY
Qty: 90 TABLET | Refills: 3 | Status: SHIPPED | OUTPATIENT
Start: 2020-02-13 | End: 2021-05-25

## 2020-02-13 RX ORDER — ACETAMINOPHEN 500 MG
5000 TABLET ORAL DAILY
COMMUNITY

## 2020-02-13 NOTE — PROGRESS NOTES
Faxed [facility]ARO to [facility] for Mammogram & Dexa Scan records.

## 2020-02-13 NOTE — LETTER
AUTHORIZATION FOR RELEASE OF   CONFIDENTIAL INFORMATION    Dear [facility],    We are seeing Luz Duke, date of birth 1960, in the clinic at [facility]. Tatianna Cook DO is the patient's PCP. Luz Duke has an outstanding lab/procedure at the time we reviewed her chart. In order to help keep her health information updated, she has authorized us to request the following medical record(s):           ( X )  MAMMOGRAM                         ( X )  DEXA SCAN                    Please fax records to us at [phone].     Attention: Patricia Maurice          Patient Name: Luz Duke  : 1960  Patient Phone #: [phone]

## 2020-02-14 ENCOUNTER — PATIENT OUTREACH (OUTPATIENT)
Dept: ADMINISTRATIVE | Facility: HOSPITAL | Age: 60
End: 2020-02-14

## 2020-08-17 ENCOUNTER — PATIENT OUTREACH (OUTPATIENT)
Dept: ADMINISTRATIVE | Facility: HOSPITAL | Age: 60
End: 2020-08-17

## 2020-08-31 LAB — HUMAN PAPILLOMAVIRUS (HPV): POSITIVE

## 2020-12-19 ENCOUNTER — PATIENT MESSAGE (OUTPATIENT)
Dept: FAMILY MEDICINE | Facility: CLINIC | Age: 60
End: 2020-12-19

## 2020-12-21 ENCOUNTER — PATIENT MESSAGE (OUTPATIENT)
Dept: FAMILY MEDICINE | Facility: CLINIC | Age: 60
End: 2020-12-21

## 2020-12-21 NOTE — TELEPHONE ENCOUNTER
I would recommend appointment to be seen.  I currently do not have any appointments due to the holidays coming up.  Please schedule her with an available provider  Dr. Tatianna Cook D.O.   Family Medicine

## 2020-12-22 ENCOUNTER — PATIENT OUTREACH (OUTPATIENT)
Dept: ADMINISTRATIVE | Facility: HOSPITAL | Age: 60
End: 2020-12-22

## 2020-12-22 NOTE — LETTER
AUTHORIZATION FOR RELEASE OF   CONFIDENTIAL INFORMATION    Dear [facility],    We are seeing Luz Duke, date of birth 1960, in the clinic at [facility]. Tatianna Cook DO is the patient's PCP. Luz Duke has an outstanding lab/procedure at the time we reviewed her chart. In order to help keep her health information updated, she has authorized us to request the following medical record(s):                ( X )  PAP SMEAR                Please fax records to us at [phone].     Attention: Patricia Maurice     If you have any questions, please contact me at [phone].           Patient Name: Luz Duke  : 1960  Patient Phone #: [phone]

## 2020-12-23 ENCOUNTER — OFFICE VISIT (OUTPATIENT)
Dept: FAMILY MEDICINE | Facility: CLINIC | Age: 60
End: 2020-12-23
Payer: COMMERCIAL

## 2020-12-23 VITALS
SYSTOLIC BLOOD PRESSURE: 130 MMHG | HEART RATE: 87 BPM | HEIGHT: 64 IN | BODY MASS INDEX: 42.19 KG/M2 | TEMPERATURE: 98 F | OXYGEN SATURATION: 97 % | RESPIRATION RATE: 18 BRPM | WEIGHT: 247.13 LBS | DIASTOLIC BLOOD PRESSURE: 70 MMHG

## 2020-12-23 DIAGNOSIS — M79.651 RIGHT THIGH PAIN: Primary | ICD-10-CM

## 2020-12-23 PROCEDURE — 3008F PR BODY MASS INDEX (BMI) DOCUMENTED: ICD-10-PCS | Mod: CPTII,S$GLB,, | Performed by: FAMILY MEDICINE

## 2020-12-23 PROCEDURE — 3008F BODY MASS INDEX DOCD: CPT | Mod: CPTII,S$GLB,, | Performed by: FAMILY MEDICINE

## 2020-12-23 PROCEDURE — 99214 OFFICE O/P EST MOD 30 MIN: CPT | Mod: S$GLB,,, | Performed by: FAMILY MEDICINE

## 2020-12-23 PROCEDURE — 1126F PR PAIN SEVERITY QUANTIFIED, NO PAIN PRESENT: ICD-10-PCS | Mod: S$GLB,,, | Performed by: FAMILY MEDICINE

## 2020-12-23 PROCEDURE — 99999 PR PBB SHADOW E&M-EST. PATIENT-LVL V: CPT | Mod: PBBFAC,,, | Performed by: FAMILY MEDICINE

## 2020-12-23 PROCEDURE — 99214 PR OFFICE/OUTPT VISIT, EST, LEVL IV, 30-39 MIN: ICD-10-PCS | Mod: S$GLB,,, | Performed by: FAMILY MEDICINE

## 2020-12-23 PROCEDURE — 1126F AMNT PAIN NOTED NONE PRSNT: CPT | Mod: S$GLB,,, | Performed by: FAMILY MEDICINE

## 2020-12-23 PROCEDURE — 99999 PR PBB SHADOW E&M-EST. PATIENT-LVL V: ICD-10-PCS | Mod: PBBFAC,,, | Performed by: FAMILY MEDICINE

## 2020-12-23 RX ORDER — METHOCARBAMOL 500 MG/1
500 TABLET, FILM COATED ORAL 4 TIMES DAILY PRN
Qty: 40 TABLET | Refills: 0 | Status: SHIPPED | OUTPATIENT
Start: 2020-12-23 | End: 2021-01-02

## 2020-12-23 NOTE — PATIENT INSTRUCTIONS
It was nice to see you, Luz!    -I think your thigh pain may be muscle related  -try the muscle relaxer as prescribed as needed  -we have referred you to physical therapy since your pain is severe when it acts up    Let me know if you have any questions/concerns.     Sincerely,     Dr Munguia

## 2020-12-23 NOTE — PROGRESS NOTES
"EST PATIENT VISIT FAMILY MEDICINE    CC:   Chief Complaint   Patient presents with    Leg Pain     right thigh       HPI: Luz Duke  is a 60 y.o. female:    Right thigh pain  -for past 3-4 weeks   -comes and goes   -feels "sore"  -when it comes on is so severe she cannot move her leg  -has not had this before  -no inciting event/trauma noted   -goes from the medial top of knee up thigh   -has been walking more over past 8 months since they got a dog   -no swelling of leg  -no overlying skin changes noted    ROS: Review of Systems   Constitutional: Negative for fever.   Respiratory: Negative for shortness of breath.    Cardiovascular: Negative for chest pain.       PMHX:   Past Medical History:   Diagnosis Date    Breast cancer 2005    chemo/lumpectomy     Depression     Encounter for blood transfusion     GERD (gastroesophageal reflux disease)        PSHX:   Past Surgical History:   Procedure Laterality Date    BREAST SURGERY Left 2005    lumpectomy,chemo and radiation    BUNIONECTOMY Right 2016     SECTION      FOOT SURGERY Left     GASTRIC BYPASS  2004    TUBAL LIGATION         FAMHX:   Family History   Problem Relation Age of Onset    Cancer Father     Cancer Paternal Aunt     Cancer Paternal Uncle        SOCHX:   Social History     Socioeconomic History    Marital status:      Spouse name: Not on file    Number of children: 1    Years of education: Not on file    Highest education level: Not on file   Occupational History    Occupation: Worked for [employer]   Social Needs    Financial resource strain: Not on file    Food insecurity     Worry: Not on file     Inability: Not on file    Transportation needs     Medical: Not on file     Non-medical: Not on file   Tobacco Use    Smoking status: Never Smoker    Smokeless tobacco: Never Used   Substance and Sexual Activity    Alcohol use: Yes     Alcohol/week: 2.0 standard drinks     Types: 1 Cans of " beer, 1 Glasses of wine per week     Comment: occ    Drug use: No    Sexual activity: Never   Lifestyle    Physical activity     Days per week: Not on file     Minutes per session: Not on file    Stress: Not on file   Relationships    Social connections     Talks on phone: Not on file     Gets together: Not on file     Attends Rastafari service: Not on file     Active member of club or organization: Not on file     Attends meetings of clubs or organizations: Not on file     Relationship status: Not on file   Other Topics Concern    Not on file   Social History Narrative    .        ALLERGIES: Review of patient's allergies indicates:  No Known Allergies    MEDS:   Current Outpatient Medications:     alendronate (FOSAMAX) 70 MG tablet, Take 70 mg by mouth every 7 days., Disp: , Rfl:     azelastine (ASTELIN) 137 mcg (0.1 %) nasal spray, , Disp: , Rfl: 0    biotin 1 mg Cap, Take by mouth., Disp: , Rfl:     CALCIUM CARBONATE/VITAMIN D3 (CALTRATE WITH VITAMIN D3 ORAL), Take 1 tablet by mouth 2 (two) times daily., Disp: , Rfl:     celecoxib (CELEBREX) 200 MG capsule, Take 200 mg by mouth Every 5 (five) days. , Disp: , Rfl:     cholecalciferol, vitamin D3, (VITAMIN D3) 125 mcg (5,000 unit) Tab, Take 5,000 Units by mouth once daily., Disp: , Rfl:     diphenhydrAMINE-acetaminophen (TYLENOL PM)  mg Tab, Take 1 tablet by mouth nightly as needed., Disp: , Rfl:     diphenoxylate-atropine 2.5-0.025 mg (LOMOTIL) 2.5-0.025 mg per tablet, Take 1 tablet by mouth as needed. , Disp: , Rfl: 5    escitalopram oxalate (LEXAPRO) 10 MG tablet, Take 10 mg by mouth once daily., Disp: , Rfl:     LORATADINE ORAL, Take by mouth., Disp: , Rfl:     multivitamin (ONE DAILY MULTIVITAMIN) per tablet, Take 1 tablet by mouth once daily., Disp: , Rfl:     NON FORMULARY MEDICATION, 500 mg 3 (three) times daily. tumeric, Disp: , Rfl:     PROAIR HFA 90 mcg/actuation inhaler, as needed., Disp: , Rfl: 0    raloxifene (EVISTA)  "60 mg tablet, Take 60 mg by mouth once daily., Disp: , Rfl:     spironolactone (ALDACTONE) 50 MG tablet, Take 1 tablet (50 mg total) by mouth once daily., Disp: 90 tablet, Rfl: 3    sucralfate (CARAFATE) 1 gram tablet, Take 1 g by mouth 2 (two) times daily., Disp: , Rfl:     methocarbamoL (ROBAXIN) 500 MG Tab, Take 1 tablet (500 mg total) by mouth 4 (four) times daily as needed., Disp: 40 tablet, Rfl: 0    OBJECTIVE:   Vitals:    12/23/20 1527   BP: 130/70   BP Location: Left arm   Patient Position: Sitting   BP Method: X-Large (Manual)   Pulse: 87   Resp: 18   Temp: 97.7 °F (36.5 °C)   TempSrc: Oral   SpO2: 97%   Weight: 112.1 kg (247 lb 1.6 oz)   Height: 5' 4" (1.626 m)     Body mass index is 42.41 kg/m².      Physical Exam  Vitals signs and nursing note reviewed.   Constitutional:       Appearance: Normal appearance.   HENT:      Head: Normocephalic and atraumatic.   Eyes:      General: No scleral icterus.     Extraocular Movements: Extraocular movements intact.   Pulmonary:      Effort: Pulmonary effort is normal.   Musculoskeletal:      Comments: Right leg:   Neg SLR  Minimal pain with adduction, no pain with abduction  No pain with internal/external rotation of hip  Area of pain non-tender to palpation    Neurological:      Mental Status: She is alert.           LABS:   A1C:      CBC:  Recent Labs   Lab 08/14/19  0816   WBC 7.71   RBC 4.34   Hemoglobin 12.6   Hematocrit 38.2   Platelets 256   MCV 88   MCH 29.0   MCHC 33.0     CMP:  Recent Labs   Lab 02/10/20  0818   Glucose 92   Calcium 8.6 L   Albumin 3.7   Total Protein 6.4   Sodium 142   Potassium 4.6   CO2 28   Chloride 107   BUN 19 H   Creatinine 0.54   Alkaline Phosphatase 86   ALT 22   AST 27   Total Bilirubin 0.3     LIPIDS:  Recent Labs   Lab 12/26/18  0751  02/10/20  0818   TSH 2.680  --   --    HDL  --    < > 67   Cholesterol  --    < > 168   Triglycerides  --    < > 67   LDL Cholesterol  --    < > 87.6   HDL/Cholesterol Ratio  --    < > 39.9 "   Non-HDL Cholesterol  --    < > 101   Total Cholesterol/HDL Ratio  --    < > 2.5    < > = values in this interval not displayed.     TSH:  Recent Labs   Lab 12/26/18  0751   TSH 2.680         ASSESSMENT & PLAN:  Encounter Diagnosis   Name Primary?    Right thigh pain Yes     -I think your thigh pain may be muscle related  -try the muscle relaxer as prescribed as needed  -we have referred you to physical therapy since your pain is severe when it acts up    Orders Placed This Encounter    Ambulatory referral/consult to Physical/Occupational Therapy    methocarbamoL (ROBAXIN) 500 MG Tab       No follow-ups on file.    RTC/ED precautions discussed where applicable.   Encouraged patient to let me know if there are any further questions/concerns.     Advise patient/caretaker to check with insurance regarding orders to avoid unexpected fees/costs.     The patient/caretaker indicates understanding of these issues and agrees with the plan.    Dr. Vanda Munguia MD  Family Medicine

## 2020-12-24 ENCOUNTER — PATIENT OUTREACH (OUTPATIENT)
Dept: ADMINISTRATIVE | Facility: HOSPITAL | Age: 60
End: 2020-12-24

## 2021-01-04 ENCOUNTER — PATIENT MESSAGE (OUTPATIENT)
Dept: ADMINISTRATIVE | Facility: HOSPITAL | Age: 61
End: 2021-01-04

## 2021-01-06 PROBLEM — M25.559 PAIN IN HIP: Status: ACTIVE | Noted: 2021-01-06

## 2021-01-06 PROBLEM — R53.1 WEAKNESS: Status: ACTIVE | Noted: 2021-01-06

## 2021-02-15 ENCOUNTER — PATIENT MESSAGE (OUTPATIENT)
Dept: FAMILY MEDICINE | Facility: CLINIC | Age: 61
End: 2021-02-15

## 2021-02-19 ENCOUNTER — PATIENT MESSAGE (OUTPATIENT)
Dept: FAMILY MEDICINE | Facility: CLINIC | Age: 61
End: 2021-02-19

## 2021-02-23 ENCOUNTER — PATIENT MESSAGE (OUTPATIENT)
Dept: FAMILY MEDICINE | Facility: CLINIC | Age: 61
End: 2021-02-23

## 2021-02-23 ENCOUNTER — TELEPHONE (OUTPATIENT)
Dept: FAMILY MEDICINE | Facility: CLINIC | Age: 61
End: 2021-02-23

## 2021-02-25 ENCOUNTER — PATIENT MESSAGE (OUTPATIENT)
Dept: ORTHOPEDICS | Facility: CLINIC | Age: 61
End: 2021-02-25

## 2021-02-25 DIAGNOSIS — M65.332 TRIGGER FINGER, LEFT MIDDLE FINGER: Primary | ICD-10-CM

## 2021-02-26 ENCOUNTER — IMMUNIZATION (OUTPATIENT)
Dept: FAMILY MEDICINE | Facility: CLINIC | Age: 61
End: 2021-02-26
Payer: COMMERCIAL

## 2021-02-26 DIAGNOSIS — Z23 NEED FOR VACCINATION: Primary | ICD-10-CM

## 2021-02-26 PROCEDURE — 91300 COVID-19, MRNA, LNP-S, PF, 30 MCG/0.3 ML DOSE VACCINE: CPT | Mod: PBBFAC | Performed by: FAMILY MEDICINE

## 2021-03-02 ENCOUNTER — PATIENT MESSAGE (OUTPATIENT)
Dept: ADMINISTRATIVE | Facility: OTHER | Age: 61
End: 2021-03-02

## 2021-03-02 ENCOUNTER — PATIENT OUTREACH (OUTPATIENT)
Dept: ADMINISTRATIVE | Facility: OTHER | Age: 61
End: 2021-03-02

## 2021-03-02 DIAGNOSIS — Z12.31 BREAST CANCER SCREENING BY MAMMOGRAM: Primary | ICD-10-CM

## 2021-03-03 ENCOUNTER — OFFICE VISIT (OUTPATIENT)
Dept: ORTHOPEDICS | Facility: CLINIC | Age: 61
End: 2021-03-03
Payer: COMMERCIAL

## 2021-03-03 ENCOUNTER — HOSPITAL ENCOUNTER (OUTPATIENT)
Dept: RADIOLOGY | Facility: OTHER | Age: 61
Discharge: HOME | End: 2021-03-03
Attending: PLASTIC SURGERY
Payer: COMMERCIAL

## 2021-03-03 VITALS — HEIGHT: 64 IN | WEIGHT: 247 LBS | BODY MASS INDEX: 42.17 KG/M2

## 2021-03-03 DIAGNOSIS — M65.332 TRIGGER FINGER, LEFT MIDDLE FINGER: ICD-10-CM

## 2021-03-03 DIAGNOSIS — M65.342 ACQUIRED TRIGGER FINGER OF LEFT RING FINGER: Primary | ICD-10-CM

## 2021-03-03 PROCEDURE — 3008F BODY MASS INDEX DOCD: CPT | Mod: CPTII,S$GLB,, | Performed by: PLASTIC SURGERY

## 2021-03-03 PROCEDURE — 73130 XR HAND COMPLETE 3 VIEW LEFT: ICD-10-PCS | Mod: 26,LT,, | Performed by: RADIOLOGY

## 2021-03-03 PROCEDURE — 99999 PR PBB SHADOW E&M-EST. PATIENT-LVL III: CPT | Mod: PBBFAC,,, | Performed by: PLASTIC SURGERY

## 2021-03-03 PROCEDURE — 20550 NJX 1 TENDON SHEATH/LIGAMENT: CPT | Mod: F3,S$GLB,, | Performed by: PLASTIC SURGERY

## 2021-03-03 PROCEDURE — 99999 PR PBB SHADOW E&M-EST. PATIENT-LVL III: ICD-10-PCS | Mod: PBBFAC,,, | Performed by: PLASTIC SURGERY

## 2021-03-03 PROCEDURE — 73130 X-RAY EXAM OF HAND: CPT | Mod: 26,LT,, | Performed by: RADIOLOGY

## 2021-03-03 PROCEDURE — 3008F PR BODY MASS INDEX (BMI) DOCUMENTED: ICD-10-PCS | Mod: CPTII,S$GLB,, | Performed by: PLASTIC SURGERY

## 2021-03-03 PROCEDURE — 1125F PR PAIN SEVERITY QUANTIFIED, PAIN PRESENT: ICD-10-PCS | Mod: S$GLB,,, | Performed by: PLASTIC SURGERY

## 2021-03-03 PROCEDURE — 1125F AMNT PAIN NOTED PAIN PRSNT: CPT | Mod: S$GLB,,, | Performed by: PLASTIC SURGERY

## 2021-03-03 PROCEDURE — 99203 PR OFFICE/OUTPT VISIT, NEW, LEVL III, 30-44 MIN: ICD-10-PCS | Mod: 25,S$GLB,, | Performed by: PLASTIC SURGERY

## 2021-03-03 PROCEDURE — 73130 X-RAY EXAM OF HAND: CPT | Mod: TC,FY,LT

## 2021-03-03 PROCEDURE — 99203 OFFICE O/P NEW LOW 30 MIN: CPT | Mod: 25,S$GLB,, | Performed by: PLASTIC SURGERY

## 2021-03-03 PROCEDURE — 20550 PR INJECT TENDON SHEATH/LIGAMENT: ICD-10-PCS | Mod: F3,S$GLB,, | Performed by: PLASTIC SURGERY

## 2021-03-03 RX ORDER — TRIAMCINOLONE ACETONIDE 40 MG/ML
20 INJECTION, SUSPENSION INTRA-ARTICULAR; INTRAMUSCULAR
Status: COMPLETED | OUTPATIENT
Start: 2021-03-03 | End: 2021-03-03

## 2021-03-03 RX ADMIN — TRIAMCINOLONE ACETONIDE 20 MG: 40 INJECTION, SUSPENSION INTRA-ARTICULAR; INTRAMUSCULAR at 11:03

## 2021-03-19 ENCOUNTER — IMMUNIZATION (OUTPATIENT)
Dept: FAMILY MEDICINE | Facility: CLINIC | Age: 61
End: 2021-03-19
Payer: COMMERCIAL

## 2021-03-19 DIAGNOSIS — Z23 NEED FOR VACCINATION: Primary | ICD-10-CM

## 2021-03-19 PROCEDURE — 91300 COVID-19, MRNA, LNP-S, PF, 30 MCG/0.3 ML DOSE VACCINE: CPT | Mod: PBBFAC | Performed by: FAMILY MEDICINE

## 2021-03-19 PROCEDURE — 0002A COVID-19, MRNA, LNP-S, PF, 30 MCG/0.3 ML DOSE VACCINE: CPT | Mod: PBBFAC | Performed by: FAMILY MEDICINE

## 2021-04-13 PROBLEM — F41.9 ANXIETY: Status: ACTIVE | Noted: 2021-04-13

## 2021-04-13 PROBLEM — M25.559 PAIN IN HIP: Status: RESOLVED | Noted: 2021-01-06 | Resolved: 2021-04-13

## 2021-04-13 RX ORDER — SPIRONOLACTONE 50 MG/1
50 TABLET, FILM COATED ORAL DAILY
Qty: 90 TABLET | Refills: 3 | Status: CANCELLED | OUTPATIENT
Start: 2021-04-13

## 2021-04-14 ENCOUNTER — PATIENT OUTREACH (OUTPATIENT)
Dept: ADMINISTRATIVE | Facility: HOSPITAL | Age: 61
End: 2021-04-14

## 2021-04-14 ENCOUNTER — OFFICE VISIT (OUTPATIENT)
Dept: FAMILY MEDICINE | Facility: CLINIC | Age: 61
End: 2021-04-14
Payer: COMMERCIAL

## 2021-04-14 VITALS
RESPIRATION RATE: 18 BRPM | SYSTOLIC BLOOD PRESSURE: 124 MMHG | DIASTOLIC BLOOD PRESSURE: 72 MMHG | OXYGEN SATURATION: 98 % | HEIGHT: 64 IN | BODY MASS INDEX: 40.12 KG/M2 | TEMPERATURE: 99 F | HEART RATE: 78 BPM | WEIGHT: 235 LBS

## 2021-04-14 DIAGNOSIS — R60.0 LOCALIZED EDEMA: ICD-10-CM

## 2021-04-14 DIAGNOSIS — Z85.3 HISTORY OF BREAST CANCER: ICD-10-CM

## 2021-04-14 DIAGNOSIS — M85.89 OSTEOPENIA OF MULTIPLE SITES: ICD-10-CM

## 2021-04-14 DIAGNOSIS — Z00.01 ENCOUNTER FOR GENERAL ADULT MEDICAL EXAMINATION WITH ABNORMAL FINDINGS: Primary | ICD-10-CM

## 2021-04-14 PROCEDURE — 99999 PR PBB SHADOW E&M-EST. PATIENT-LVL IV: CPT | Mod: PBBFAC,,, | Performed by: FAMILY MEDICINE

## 2021-04-14 PROCEDURE — 99396 PR PREVENTIVE VISIT,EST,40-64: ICD-10-PCS | Mod: S$GLB,,, | Performed by: FAMILY MEDICINE

## 2021-04-14 PROCEDURE — 1126F AMNT PAIN NOTED NONE PRSNT: CPT | Mod: S$GLB,,, | Performed by: FAMILY MEDICINE

## 2021-04-14 PROCEDURE — 3008F BODY MASS INDEX DOCD: CPT | Mod: CPTII,S$GLB,, | Performed by: FAMILY MEDICINE

## 2021-04-14 PROCEDURE — 99396 PREV VISIT EST AGE 40-64: CPT | Mod: S$GLB,,, | Performed by: FAMILY MEDICINE

## 2021-04-14 PROCEDURE — 1126F PR PAIN SEVERITY QUANTIFIED, NO PAIN PRESENT: ICD-10-PCS | Mod: S$GLB,,, | Performed by: FAMILY MEDICINE

## 2021-04-14 PROCEDURE — 3008F PR BODY MASS INDEX (BMI) DOCUMENTED: ICD-10-PCS | Mod: CPTII,S$GLB,, | Performed by: FAMILY MEDICINE

## 2021-04-14 PROCEDURE — 99999 PR PBB SHADOW E&M-EST. PATIENT-LVL IV: ICD-10-PCS | Mod: PBBFAC,,, | Performed by: FAMILY MEDICINE

## 2021-04-19 ENCOUNTER — PATIENT MESSAGE (OUTPATIENT)
Dept: FAMILY MEDICINE | Facility: CLINIC | Age: 61
End: 2021-04-19

## 2021-04-28 ENCOUNTER — PATIENT OUTREACH (OUTPATIENT)
Dept: ADMINISTRATIVE | Facility: HOSPITAL | Age: 61
End: 2021-04-28

## 2021-07-19 PROBLEM — Z00.01 ENCOUNTER FOR GENERAL ADULT MEDICAL EXAMINATION WITH ABNORMAL FINDINGS: Status: RESOLVED | Noted: 2021-04-14 | Resolved: 2021-07-19

## 2021-10-12 ENCOUNTER — PATIENT MESSAGE (OUTPATIENT)
Dept: FAMILY MEDICINE | Facility: CLINIC | Age: 61
End: 2021-10-12

## 2021-10-15 ENCOUNTER — IMMUNIZATION (OUTPATIENT)
Dept: FAMILY MEDICINE | Facility: CLINIC | Age: 61
End: 2021-10-15
Payer: COMMERCIAL

## 2021-10-15 DIAGNOSIS — Z23 NEED FOR VACCINATION: Primary | ICD-10-CM

## 2021-10-15 PROCEDURE — 91300 COVID-19, MRNA, LNP-S, PF, 30 MCG/0.3 ML DOSE VACCINE: CPT | Mod: PBBFAC | Performed by: FAMILY MEDICINE

## 2021-10-15 PROCEDURE — 0003A COVID-19, MRNA, LNP-S, PF, 30 MCG/0.3 ML DOSE VACCINE: CPT | Mod: PBBFAC | Performed by: FAMILY MEDICINE

## 2021-10-20 ENCOUNTER — PATIENT MESSAGE (OUTPATIENT)
Dept: FAMILY MEDICINE | Facility: CLINIC | Age: 61
End: 2021-10-20

## 2021-10-20 ENCOUNTER — PATIENT MESSAGE (OUTPATIENT)
Dept: FAMILY MEDICINE | Facility: CLINIC | Age: 61
End: 2021-10-20
Payer: COMMERCIAL

## 2021-10-20 DIAGNOSIS — M85.89 OSTEOPENIA OF MULTIPLE SITES: Primary | ICD-10-CM

## 2021-10-27 ENCOUNTER — PATIENT MESSAGE (OUTPATIENT)
Dept: FAMILY MEDICINE | Facility: CLINIC | Age: 61
End: 2021-10-27
Payer: COMMERCIAL

## 2021-10-27 DIAGNOSIS — M85.89 OSTEOPENIA OF MULTIPLE SITES: ICD-10-CM

## 2021-10-27 DIAGNOSIS — M85.89 OSTEOPENIA OF MULTIPLE SITES: Primary | ICD-10-CM

## 2021-10-28 RX ORDER — RALOXIFENE HYDROCHLORIDE 60 MG/1
60 TABLET, FILM COATED ORAL DAILY
Qty: 90 TABLET | Refills: 3 | Status: SHIPPED | OUTPATIENT
Start: 2021-10-28 | End: 2022-10-20 | Stop reason: SDUPTHER

## 2021-11-05 ENCOUNTER — PATIENT MESSAGE (OUTPATIENT)
Dept: FAMILY MEDICINE | Facility: CLINIC | Age: 61
End: 2021-11-05
Payer: COMMERCIAL

## 2021-11-05 DIAGNOSIS — M85.89 OSTEOPENIA OF MULTIPLE SITES: ICD-10-CM

## 2021-11-06 ENCOUNTER — PATIENT MESSAGE (OUTPATIENT)
Dept: FAMILY MEDICINE | Facility: CLINIC | Age: 61
End: 2021-11-06
Payer: COMMERCIAL

## 2021-12-09 ENCOUNTER — PATIENT MESSAGE (OUTPATIENT)
Dept: FAMILY MEDICINE | Facility: CLINIC | Age: 61
End: 2021-12-09
Payer: COMMERCIAL

## 2021-12-16 ENCOUNTER — PATIENT MESSAGE (OUTPATIENT)
Dept: ADMINISTRATIVE | Facility: HOSPITAL | Age: 61
End: 2021-12-16
Payer: COMMERCIAL

## 2021-12-17 ENCOUNTER — PATIENT OUTREACH (OUTPATIENT)
Dept: ADMINISTRATIVE | Facility: HOSPITAL | Age: 61
End: 2021-12-17
Payer: COMMERCIAL

## 2021-12-20 ENCOUNTER — PATIENT OUTREACH (OUTPATIENT)
Dept: ADMINISTRATIVE | Facility: HOSPITAL | Age: 61
End: 2021-12-20
Payer: COMMERCIAL

## 2022-04-04 ENCOUNTER — TELEPHONE (OUTPATIENT)
Dept: FAMILY MEDICINE | Facility: CLINIC | Age: 62
End: 2022-04-04
Payer: COMMERCIAL

## 2022-04-04 DIAGNOSIS — Z00.01 ENCOUNTER FOR GENERAL ADULT MEDICAL EXAMINATION WITH ABNORMAL FINDINGS: ICD-10-CM

## 2022-04-04 DIAGNOSIS — Z11.4 SCREENING FOR HIV (HUMAN IMMUNODEFICIENCY VIRUS): ICD-10-CM

## 2022-04-04 DIAGNOSIS — M85.89 OSTEOPENIA OF MULTIPLE SITES: Primary | ICD-10-CM

## 2022-04-04 NOTE — TELEPHONE ENCOUNTER
----- Message from Anamaria Lam MA sent at 4/2/2022 10:45 AM CDT -----  Pt scheduled to see you 4/27/22 please advise on labs.

## 2022-04-04 NOTE — TELEPHONE ENCOUNTER
Orders Placed This Encounter    Comprehensive Metabolic Panel    CBC Without Differential    Lipid Panel    TSH    HIV 1/2 Ag/Ab (4th Gen)     Dr. Tatianna Cook D.O.   [facility]

## 2022-04-14 ENCOUNTER — PATIENT MESSAGE (OUTPATIENT)
Dept: FAMILY MEDICINE | Facility: CLINIC | Age: 62
End: 2022-04-14
Payer: COMMERCIAL

## 2022-04-25 ENCOUNTER — PATIENT MESSAGE (OUTPATIENT)
Dept: FAMILY MEDICINE | Facility: CLINIC | Age: 62
End: 2022-04-25
Payer: COMMERCIAL

## 2022-04-26 PROBLEM — R94.6 ABNORMAL THYROID FUNCTION TEST: Status: ACTIVE | Noted: 2022-04-26

## 2022-04-26 NOTE — PROGRESS NOTES
FAMILY MEDICINE  [facility]    Reason for visit:   Chief Complaint   Patient presents with    Annual Exam       SUBJECTIVE: Luz Duke is a 61 y.o. female  - with history of left breast cancer (2005 s/p lumpectomy, chemotherapy and radiation), osteopenia, depression, GERD, SIMONA, osteoarthritis and chronic left lower leg swelling (s/p recurrent cellulitis) presents routine annual physical      Oncology Dr. Beto Vazquez ([place])  Gynecology Dr. Suzan Leo ([facility])  - follows PAP, Mammogram and Dexa  Gastroenterology Dr. Jameel June ([facility])     Today she reports she is doing well. She has a few mosquito bites and would like a stronger cream than the OTC hydrocortisone to help with the itching. They are not painful and she denies any drainage        Review of Systems   All other systems reviewed and are negative.      HEALTH MAINTENANCE:   Health Maintenance   Topic Date Due    Mammogram  2022    Lipid Panel  2027    TETANUS VACCINE  2029    Hepatitis C Screening  Completed     Health Maintenance Topics with due status: Not Due       Topic Last Completion Date    Colorectal Cancer Screening 2015    TETANUS VACCINE 2019    Cervical Cancer Screening 10/18/2021    Mammogram 2021    Lipid Panel 2022     Health Maintenance Due   Topic Date Due    COVID-19 Vaccine (4 - Booster for Pfizer series) 01/15/2022       HISTORY:   Past Medical History:   Diagnosis Date    Breast cancer 2005    chemo/lumpectomy     Depression     Encounter for blood transfusion     GERD (gastroesophageal reflux disease)        Past Surgical History:   Procedure Laterality Date    BREAST SURGERY Left 2005    lumpectomy,chemo and radiation    BUNIONECTOMY Right 2016     SECTION      FOOT SURGERY Left     GASTRIC BYPASS  2004    TUBAL LIGATION         Family History   Problem Relation Age of Onset    Cancer Father     Cancer Paternal Aunt      Cancer Paternal Uncle        Social History     Tobacco Use    Smoking status: Never Smoker    Smokeless tobacco: Never Used   Substance Use Topics    Alcohol use: Yes     Alcohol/week: 2.0 standard drinks     Types: 1 Cans of beer, 1 Glasses of wine per week     Comment: occ    Drug use: No       Social History     Social History Narrative    . Ally Clemons is a        ALLERGIES:   Review of patient's allergies indicates:  No Known Allergies    MEDS:     Current Outpatient Medications:     ascorbic acid, vitamin C, (VITAMIN C) 1000 MG tablet, , Disp: , Rfl:     biotin 1 mg Cap, Take by mouth., Disp: , Rfl:     CALCIUM CARBONATE/VITAMIN D3 (CALTRATE WITH VITAMIN D3 ORAL), Take 1 tablet by mouth 2 (two) times daily., Disp: , Rfl:     cholecalciferol, vitamin D3, 125 mcg (5,000 unit) Tab, Take 5,000 Units by mouth once daily., Disp: , Rfl:     diphenhydrAMINE-acetaminophen (TYLENOL PM)  mg Tab, Take 1 tablet by mouth nightly as needed., Disp: , Rfl:     escitalopram oxalate (LEXAPRO) 10 MG tablet, Take 10 mg by mouth once daily., Disp: , Rfl:     LORATADINE ORAL, Take by mouth., Disp: , Rfl:     multivitamin (THERAGRAN) per tablet, Take 1 tablet by mouth once daily., Disp: , Rfl:     NON FORMULARY MEDICATION, 500 mg 3 (three) times daily. tumeric, Disp: , Rfl:     raloxifene (EVISTA) 60 mg tablet, Take 1 tablet (60 mg total) by mouth once daily., Disp: 90 tablet, Rfl: 3    spironolactone (ALDACTONE) 50 MG tablet, Take 1 tablet by mouth once daily, Disp: 30 tablet, Rfl: 11    sucralfate (CARAFATE) 1 gram tablet, Take 1 g by mouth 2 (two) times daily., Disp: , Rfl:     triamcinolone acetonide 0.5% (KENALOG) 0.5 % Crea, Apply topically 2 (two) times daily., Disp: 30 g, Rfl: 0      Vital signs:   Vitals:    04/27/22 0933   BP: 110/60   BP Location: Left arm   Patient Position: Sitting   BP Method: X-Large (Manual)   Pulse: 84   Resp: 18   SpO2: 97%   Weight: 107.9 kg  "(237 lb 12.8 oz)   Height: 5' 4" (1.626 m)     Body mass index is 40.82 kg/m².  PHYSICAL EXAM:     Physical Exam  Vitals reviewed.   Constitutional:       General: She is not in acute distress.  HENT:      Head: Normocephalic and atraumatic.   Eyes:      Pupils: Pupils are equal, round, and reactive to light.   Cardiovascular:      Rate and Rhythm: Normal rate and regular rhythm.      Heart sounds: Normal heart sounds. No murmur heard.  Pulmonary:      Effort: Pulmonary effort is normal.      Breath sounds: Normal breath sounds. No wheezing or rales.   Abdominal:      General: Bowel sounds are normal. There is no distension.      Palpations: Abdomen is soft.      Tenderness: There is no abdominal tenderness.   Musculoskeletal:      Cervical back: Normal range of motion and neck supple.   Skin:     General: Skin is warm.      Capillary Refill: Capillary refill takes less than 2 seconds.      Comments: Small Erythematous hives with localized induration, non-tender, no drainage, no fluctuance, no calor (left ankle, right elbow and left neck)    Neurological:      Mental Status: She is alert.           PHQ4 = Score: 0    PERTINENT RESULTS:   Lab Visit on 04/21/2022   Component Date Value Ref Range Status    Sodium 04/21/2022 142  136 - 145 mmol/L Final    Potassium 04/21/2022 4.4  3.5 - 5.1 mmol/L Final    Chloride 04/21/2022 111 (A) 95 - 110 mmol/L Final    CO2 04/21/2022 29  23 - 29 mmol/L Final    Glucose 04/21/2022 78  70 - 110 mg/dL Final    BUN 04/21/2022 16  7 - 17 mg/dL Final    Creatinine 04/21/2022 0.55  0.50 - 1.40 mg/dL Final    Calcium 04/21/2022 8.8  8.7 - 10.5 mg/dL Final    Total Protein 04/21/2022 6.4  6.0 - 8.4 g/dL Final    Albumin 04/21/2022 3.5  3.5 - 5.2 g/dL Final    Total Bilirubin 04/21/2022 0.4  0.1 - 1.0 mg/dL Final    Comment: For infants and newborns, interpretation of results should be based  on gestational age, weight and in agreement with clinical  observations.    Premature " Infant recommended reference ranges:  Up to 24 hours.............<8.0 mg/dL  Up to 48 hours............<12.0 mg/dL  3-5 days..................<15.0 mg/dL  6-29 days.................<15.0 mg/dL      Alkaline Phosphatase 04/21/2022 92  38 - 126 U/L Final    AST 04/21/2022 30  15 - 46 U/L Final    ALT 04/21/2022 28  10 - 44 U/L Final    Anion Gap 04/21/2022 2 (A) 8 - 16 mmol/L Final    eGFR if African American 04/21/2022 >60.0  >60 mL/min/1.73 m^2 Final    eGFR if non African American 04/21/2022 >60.0  >60 mL/min/1.73 m^2 Final    Comment: Calculation used to obtain the estimated glomerular filtration  rate (eGFR) is the CKD-EPI equation.       WBC 04/21/2022 6.71  3.90 - 12.70 K/uL Final    RBC 04/21/2022 4.48  4.00 - 5.40 M/uL Final    Hemoglobin 04/21/2022 12.0  12.0 - 16.0 g/dL Final    Hematocrit 04/21/2022 37.5  37.0 - 48.5 % Final    MCV 04/21/2022 84  82 - 98 fL Final    MCH 04/21/2022 26.8 (A) 27.0 - 31.0 pg Final    MCHC 04/21/2022 32.0  32.0 - 36.0 g/dL Final    RDW 04/21/2022 14.5  11.5 - 14.5 % Final    Platelets 04/21/2022 276  150 - 450 K/uL Final    MPV 04/21/2022 10.8  9.2 - 12.9 fL Final    Cholesterol 04/21/2022 155  120 - 199 mg/dL Final    Comment: The National Cholesterol Education Program (NCEP) has set the  following guidelines (reference ranges) for Cholesterol:  Optimal.....................<200 mg/dL  Borderline High.............200-239 mg/dL  High........................> or = 240 mg/dL      Triglycerides 04/21/2022 63  30 - 150 mg/dL Final    Comment: The National Cholesterol Education Program (NCEP) has set the  following guidelines (reference values) for triglycerides:  Normal......................<150 mg/dL  Borderline High.............150-199 mg/dL  High........................200-499 mg/dL      HDL 04/21/2022 64  40 - 75 mg/dL Final    Comment: The National Cholesterol Education Program (NCEP) has set the  following guidelines (reference values) for HDL  Cholesterol:  Low...............<40 mg/dL  Optimal...........>60 mg/dL      LDL Cholesterol 04/21/2022 78.4  63.0 - 159.0 mg/dL Final    Comment: The National Cholesterol Education Program (NCEP) has set the  following guidelines (reference values) for LDL Cholesterol:  Optimal.......................<130 mg/dL  Borderline High...............130-159 mg/dL  High..........................160-189 mg/dL  Very High.....................>190 mg/dL      HDL/Cholesterol Ratio 04/21/2022 41.3  20.0 - 50.0 % Final    Total Cholesterol/HDL Ratio 04/21/2022 2.4  2.0 - 5.0 Final    Non-HDL Cholesterol 04/21/2022 91  mg/dL Final    Comment: Risk category and Non-HDL cholesterol goals:  Coronary heart disease (CHD)or equivalent (10-year risk of CHD >20%):  Non-HDL cholesterol goal     <130 mg/dL  Two or more CHD risk factors and 10-year risk of CHD <= 20%:  Non-HDL cholesterol goal     <160 mg/dL  0 to 1 CHD risk factor:  Non-HDL cholesterol goal     <190 mg/dL      TSH 04/21/2022 0.365 (A) 0.400 - 4.000 uIU/mL Final    Comment: Warning:  Heterophilic antibodies in serum or plasma of   certain individuals are known to cause interference with   immunoassays. These antibodies may be present in blood samples   from individuals regularly exposed to animal or who have been   treated with animal products.     Patients taking high doses of supplemental biotin may have  negatively biased results.       HIV 1/2 Ag/Ab 04/21/2022 Negative  Negative Final    Free T4 04/21/2022 0.84  0.71 - 1.51 ng/dL Final       ASSESSMENT/PLAN:  Problem List Items Addressed This Visit        Derm    Dermatitis    Overview     - no signs of infection  - trial triamcinolone cream  - notify me if no improvement or any worsening           Relevant Medications    triamcinolone acetonide 0.5% (KENALOG) 0.5 % Crea       Oncology    History of breast cancer    Overview     - 2005  - left breast s/p chemo and radiation  - followed by Dr. George Flaherty               Endocrine    Abnormal thyroid function test    Overview     Lab Results   Component Value Date    TSH 0.365 (L) 04/21/2022    FREET4 0.84 04/21/2022     - she is taking biotin  - asymptomatic   - recommend stop biotin and repeat thyroid labs in 1 month           Relevant Orders    TSH    T4, Free    Thyroid Peroxidase Antibody    T4, Free    Class 3 severe obesity due to excess calories with serious comorbidity and body mass index (BMI) of 40.0 to 44.9 in adult    Overview     - discussed recommendation for diet, exercise and weight loss              Orthopedic    Osteopenia    Overview     - 10/27/2021 DEXA:  Osteopenia lumbar spine T-score -2.1.  Left femoral neck T-score -1.  Right femoral neck T-score-0.9.  Left total hip -0.1.  Right total hip -0.5.  There is a 6.8% risk of major osteoporotic fracture and 0.4 risk of hip fracture  - bone densities improved from 2019 DEXA  - okay to continue Evista (no need for additional fosamx stopped 11/6/21)   - fall prevention  - weight bearing exercises like walking, squats, stair and jogging if able  - over the counter vitamin D3 8577-6237 units daily  - dietary calcium 1200 mg per day with diet or supplements   - repeat bone density scan in 2-3 years                Other    Encounter for general adult medical examination with abnormal findings - Primary    Overview     - preventative health counseling  - counseling on current recommendations for breast cancer screening. Pt up to date   - counseling on current recommendations for cervical cancer screening. Pt up to date   - counseling on current recommendations for colon cancer screening. Pt up to date  - Vaccines recommended at this visit: see below           Relevant Orders    CBC Without Differential    Comprehensive Metabolic Panel    Lipid Panel    TSH          ORDERS:   Orders Placed This Encounter    TSH    T4, Free    CBC Without Differential    Comprehensive Metabolic Panel    Lipid Panel    TSH     Thyroid Peroxidase Antibody    T4, Free    triamcinolone acetonide 0.5% (KENALOG) 0.5 % Crea       Vaccines recommended: 4th covid-19 vaccine    Follow-up in 1 month repeat thyroid labs and 1 year annual or sooner if any concerns.      This note is dictated using the M*Modal Fluency Direct word recognition program. There are word recognition mistakes that are occasionally missed on review.    Dr. Tatianna Cook D.O.   [facility]

## 2022-04-27 ENCOUNTER — OFFICE VISIT (OUTPATIENT)
Dept: FAMILY MEDICINE | Facility: CLINIC | Age: 62
End: 2022-04-27
Payer: COMMERCIAL

## 2022-04-27 VITALS
DIASTOLIC BLOOD PRESSURE: 60 MMHG | OXYGEN SATURATION: 97 % | HEART RATE: 84 BPM | WEIGHT: 237.81 LBS | BODY MASS INDEX: 40.6 KG/M2 | HEIGHT: 64 IN | RESPIRATION RATE: 18 BRPM | SYSTOLIC BLOOD PRESSURE: 110 MMHG

## 2022-04-27 DIAGNOSIS — Z00.01 ENCOUNTER FOR GENERAL ADULT MEDICAL EXAMINATION WITH ABNORMAL FINDINGS: Primary | ICD-10-CM

## 2022-04-27 DIAGNOSIS — Z85.3 HISTORY OF BREAST CANCER: ICD-10-CM

## 2022-04-27 DIAGNOSIS — L30.9 DERMATITIS: ICD-10-CM

## 2022-04-27 DIAGNOSIS — R94.6 ABNORMAL THYROID FUNCTION TEST: ICD-10-CM

## 2022-04-27 DIAGNOSIS — E66.01 CLASS 3 SEVERE OBESITY DUE TO EXCESS CALORIES WITH SERIOUS COMORBIDITY AND BODY MASS INDEX (BMI) OF 40.0 TO 44.9 IN ADULT: ICD-10-CM

## 2022-04-27 DIAGNOSIS — M85.89 OSTEOPENIA OF MULTIPLE SITES: ICD-10-CM

## 2022-04-27 PROBLEM — F41.9 ANXIETY: Status: RESOLVED | Noted: 2021-04-13 | Resolved: 2022-04-27

## 2022-04-27 PROBLEM — F33.42 RECURRENT MAJOR DEPRESSIVE DISORDER, IN FULL REMISSION: Status: RESOLVED | Noted: 2019-03-29 | Resolved: 2022-04-27

## 2022-04-27 PROCEDURE — 1159F PR MEDICATION LIST DOCUMENTED IN MEDICAL RECORD: ICD-10-PCS | Mod: CPTII,S$GLB,, | Performed by: FAMILY MEDICINE

## 2022-04-27 PROCEDURE — 99396 PR PREVENTIVE VISIT,EST,40-64: ICD-10-PCS | Mod: S$GLB,,, | Performed by: FAMILY MEDICINE

## 2022-04-27 PROCEDURE — 3008F PR BODY MASS INDEX (BMI) DOCUMENTED: ICD-10-PCS | Mod: CPTII,S$GLB,, | Performed by: FAMILY MEDICINE

## 2022-04-27 PROCEDURE — 3008F BODY MASS INDEX DOCD: CPT | Mod: CPTII,S$GLB,, | Performed by: FAMILY MEDICINE

## 2022-04-27 PROCEDURE — 3074F SYST BP LT 130 MM HG: CPT | Mod: CPTII,S$GLB,, | Performed by: FAMILY MEDICINE

## 2022-04-27 PROCEDURE — 99999 PR PBB SHADOW E&M-EST. PATIENT-LVL IV: ICD-10-PCS | Mod: PBBFAC,,, | Performed by: FAMILY MEDICINE

## 2022-04-27 PROCEDURE — 3074F PR MOST RECENT SYSTOLIC BLOOD PRESSURE < 130 MM HG: ICD-10-PCS | Mod: CPTII,S$GLB,, | Performed by: FAMILY MEDICINE

## 2022-04-27 PROCEDURE — 3078F PR MOST RECENT DIASTOLIC BLOOD PRESSURE < 80 MM HG: ICD-10-PCS | Mod: CPTII,S$GLB,, | Performed by: FAMILY MEDICINE

## 2022-04-27 PROCEDURE — 99999 PR PBB SHADOW E&M-EST. PATIENT-LVL IV: CPT | Mod: PBBFAC,,, | Performed by: FAMILY MEDICINE

## 2022-04-27 PROCEDURE — 1159F MED LIST DOCD IN RCRD: CPT | Mod: CPTII,S$GLB,, | Performed by: FAMILY MEDICINE

## 2022-04-27 PROCEDURE — 3078F DIAST BP <80 MM HG: CPT | Mod: CPTII,S$GLB,, | Performed by: FAMILY MEDICINE

## 2022-04-27 PROCEDURE — 99396 PREV VISIT EST AGE 40-64: CPT | Mod: S$GLB,,, | Performed by: FAMILY MEDICINE

## 2022-04-27 RX ORDER — IBUPROFEN 100 MG/5ML
SUSPENSION, ORAL (FINAL DOSE FORM) ORAL
COMMUNITY
Start: 2021-01-03

## 2022-04-27 RX ORDER — TRIAMCINOLONE ACETONIDE 5 MG/G
CREAM TOPICAL 2 TIMES DAILY
Qty: 30 G | Refills: 0 | Status: SHIPPED | OUTPATIENT
Start: 2022-04-27 | End: 2022-08-10 | Stop reason: SDUPTHER

## 2022-05-24 ENCOUNTER — PATIENT MESSAGE (OUTPATIENT)
Dept: FAMILY MEDICINE | Facility: CLINIC | Age: 62
End: 2022-05-24
Payer: COMMERCIAL

## 2022-05-25 ENCOUNTER — PATIENT MESSAGE (OUTPATIENT)
Dept: FAMILY MEDICINE | Facility: CLINIC | Age: 62
End: 2022-05-25
Payer: COMMERCIAL

## 2022-06-06 DIAGNOSIS — R60.0 LOCALIZED EDEMA: ICD-10-CM

## 2022-06-07 RX ORDER — SPIRONOLACTONE 50 MG/1
50 TABLET, FILM COATED ORAL DAILY
Qty: 30 TABLET | Refills: 11 | Status: SHIPPED | OUTPATIENT
Start: 2022-06-07 | End: 2022-08-15 | Stop reason: SDUPTHER

## 2022-06-07 NOTE — TELEPHONE ENCOUNTER
No new care gaps identified.  [facility] Embedded Care Gaps. Reference number: [id number]. 6/06/2022   9:51:08 PM CDT

## 2022-08-01 PROBLEM — Z00.01 ENCOUNTER FOR GENERAL ADULT MEDICAL EXAMINATION WITH ABNORMAL FINDINGS: Status: RESOLVED | Noted: 2021-04-14 | Resolved: 2022-08-01

## 2022-08-10 ENCOUNTER — PATIENT MESSAGE (OUTPATIENT)
Dept: FAMILY MEDICINE | Facility: CLINIC | Age: 62
End: 2022-08-10
Payer: COMMERCIAL

## 2022-08-12 ENCOUNTER — PATIENT MESSAGE (OUTPATIENT)
Dept: FAMILY MEDICINE | Facility: CLINIC | Age: 62
End: 2022-08-12
Payer: COMMERCIAL

## 2022-08-12 DIAGNOSIS — R60.0 LOCALIZED EDEMA: ICD-10-CM

## 2022-08-15 RX ORDER — SPIRONOLACTONE 50 MG/1
50 TABLET, FILM COATED ORAL DAILY
Qty: 90 TABLET | Refills: 2 | Status: SHIPPED | OUTPATIENT
Start: 2022-08-15 | End: 2023-04-26 | Stop reason: SDUPTHER

## 2022-08-15 NOTE — TELEPHONE ENCOUNTER
No new care gaps identified.  [facility] Embedded Care Gaps. Reference number: [id number]. 8/15/2022   7:58:57 AM QUINTINT

## 2022-10-13 ENCOUNTER — PATIENT MESSAGE (OUTPATIENT)
Dept: FAMILY MEDICINE | Facility: CLINIC | Age: 62
End: 2022-10-13
Payer: COMMERCIAL

## 2022-10-18 ENCOUNTER — IMMUNIZATION (OUTPATIENT)
Dept: FAMILY MEDICINE | Facility: CLINIC | Age: 62
End: 2022-10-18
Payer: COMMERCIAL

## 2022-10-18 DIAGNOSIS — Z23 NEED FOR VACCINATION: Primary | ICD-10-CM

## 2022-10-18 PROCEDURE — 91312 COVID-19, MRNA, LNP-S, BIVALENT BOOSTER, PF, 30 MCG/0.3 ML DOSE: CPT | Mod: S$GLB,,, | Performed by: FAMILY MEDICINE

## 2022-10-18 PROCEDURE — 0124A COVID-19, MRNA, LNP-S, BIVALENT BOOSTER, PF, 30 MCG/0.3 ML DOSE: CPT | Mod: PBBFAC | Performed by: FAMILY MEDICINE

## 2022-10-18 PROCEDURE — 91312 COVID-19, MRNA, LNP-S, BIVALENT BOOSTER, PF, 30 MCG/0.3 ML DOSE: ICD-10-PCS | Mod: S$GLB,,, | Performed by: FAMILY MEDICINE

## 2023-01-18 ENCOUNTER — PATIENT MESSAGE (OUTPATIENT)
Dept: ADMINISTRATIVE | Facility: HOSPITAL | Age: 63
End: 2023-01-18
Payer: COMMERCIAL

## 2023-04-18 ENCOUNTER — PATIENT MESSAGE (OUTPATIENT)
Dept: ADMINISTRATIVE | Facility: HOSPITAL | Age: 63
End: 2023-04-18
Payer: COMMERCIAL

## 2023-04-26 ENCOUNTER — OFFICE VISIT (OUTPATIENT)
Dept: FAMILY MEDICINE | Facility: CLINIC | Age: 63
End: 2023-04-26
Payer: COMMERCIAL

## 2023-04-26 VITALS
SYSTOLIC BLOOD PRESSURE: 110 MMHG | HEART RATE: 82 BPM | WEIGHT: 246.69 LBS | HEIGHT: 64 IN | OXYGEN SATURATION: 97 % | DIASTOLIC BLOOD PRESSURE: 64 MMHG | BODY MASS INDEX: 42.12 KG/M2

## 2023-04-26 DIAGNOSIS — F39 MOOD DISORDER: ICD-10-CM

## 2023-04-26 DIAGNOSIS — Z85.3 HISTORY OF BREAST CANCER: ICD-10-CM

## 2023-04-26 DIAGNOSIS — M85.89 OSTEOPENIA OF MULTIPLE SITES: ICD-10-CM

## 2023-04-26 DIAGNOSIS — I73.9 PERIPHERAL VASCULAR DISEASE, UNSPECIFIED: ICD-10-CM

## 2023-04-26 DIAGNOSIS — Z00.00 ANNUAL PHYSICAL EXAM: Primary | ICD-10-CM

## 2023-04-26 DIAGNOSIS — M25.562 ACUTE PAIN OF LEFT KNEE: ICD-10-CM

## 2023-04-26 DIAGNOSIS — E66.01 CLASS 3 SEVERE OBESITY DUE TO EXCESS CALORIES WITH SERIOUS COMORBIDITY AND BODY MASS INDEX (BMI) OF 40.0 TO 44.9 IN ADULT: ICD-10-CM

## 2023-04-26 DIAGNOSIS — R60.0 LOCALIZED EDEMA: ICD-10-CM

## 2023-04-26 PROBLEM — E66.813 CLASS 3 SEVERE OBESITY DUE TO EXCESS CALORIES WITH SERIOUS COMORBIDITY AND BODY MASS INDEX (BMI) OF 40.0 TO 44.9 IN ADULT: Chronic | Status: ACTIVE | Noted: 2019-03-29

## 2023-04-26 PROBLEM — M85.80 OSTEOPENIA: Chronic | Status: ACTIVE | Noted: 2018-12-26

## 2023-04-26 PROCEDURE — 3074F SYST BP LT 130 MM HG: CPT | Mod: CPTII,S$GLB,, | Performed by: FAMILY MEDICINE

## 2023-04-26 PROCEDURE — 3074F PR MOST RECENT SYSTOLIC BLOOD PRESSURE < 130 MM HG: ICD-10-PCS | Mod: CPTII,S$GLB,, | Performed by: FAMILY MEDICINE

## 2023-04-26 PROCEDURE — 3008F BODY MASS INDEX DOCD: CPT | Mod: CPTII,S$GLB,, | Performed by: FAMILY MEDICINE

## 2023-04-26 PROCEDURE — 99396 PR PREVENTIVE VISIT,EST,40-64: ICD-10-PCS | Mod: 25,S$GLB,, | Performed by: FAMILY MEDICINE

## 2023-04-26 PROCEDURE — 1159F MED LIST DOCD IN RCRD: CPT | Mod: CPTII,S$GLB,, | Performed by: FAMILY MEDICINE

## 2023-04-26 PROCEDURE — 1160F PR REVIEW ALL MEDS BY PRESCRIBER/CLIN PHARMACIST DOCUMENTED: ICD-10-PCS | Mod: CPTII,S$GLB,, | Performed by: FAMILY MEDICINE

## 2023-04-26 PROCEDURE — 3008F PR BODY MASS INDEX (BMI) DOCUMENTED: ICD-10-PCS | Mod: CPTII,S$GLB,, | Performed by: FAMILY MEDICINE

## 2023-04-26 PROCEDURE — 3078F DIAST BP <80 MM HG: CPT | Mod: CPTII,S$GLB,, | Performed by: FAMILY MEDICINE

## 2023-04-26 PROCEDURE — 99999 PR PBB SHADOW E&M-EST. PATIENT-LVL IV: CPT | Mod: PBBFAC,,, | Performed by: FAMILY MEDICINE

## 2023-04-26 PROCEDURE — 3078F PR MOST RECENT DIASTOLIC BLOOD PRESSURE < 80 MM HG: ICD-10-PCS | Mod: CPTII,S$GLB,, | Performed by: FAMILY MEDICINE

## 2023-04-26 PROCEDURE — 1159F PR MEDICATION LIST DOCUMENTED IN MEDICAL RECORD: ICD-10-PCS | Mod: CPTII,S$GLB,, | Performed by: FAMILY MEDICINE

## 2023-04-26 PROCEDURE — 1160F RVW MEDS BY RX/DR IN RCRD: CPT | Mod: CPTII,S$GLB,, | Performed by: FAMILY MEDICINE

## 2023-04-26 PROCEDURE — 99999 PR PBB SHADOW E&M-EST. PATIENT-LVL IV: ICD-10-PCS | Mod: PBBFAC,,, | Performed by: FAMILY MEDICINE

## 2023-04-26 PROCEDURE — 99396 PREV VISIT EST AGE 40-64: CPT | Mod: 25,S$GLB,, | Performed by: FAMILY MEDICINE

## 2023-04-26 RX ORDER — FAMOTIDINE 20 MG/1
20 TABLET, FILM COATED ORAL 2 TIMES DAILY
COMMUNITY
Start: 2023-02-28

## 2023-04-26 RX ORDER — SPIRONOLACTONE 50 MG/1
50 TABLET, FILM COATED ORAL DAILY
Qty: 90 TABLET | Refills: 3 | Status: SHIPPED | OUTPATIENT
Start: 2023-04-26

## 2023-04-26 RX ORDER — MELOXICAM 7.5 MG/1
7.5 TABLET ORAL DAILY PRN
Qty: 30 TABLET | Refills: 0 | Status: SHIPPED | OUTPATIENT
Start: 2023-04-26

## 2023-04-26 RX ORDER — ESCITALOPRAM OXALATE 10 MG/1
10 TABLET ORAL DAILY
Qty: 90 TABLET | Refills: 3 | Status: SHIPPED | OUTPATIENT
Start: 2023-04-26

## 2023-04-26 NOTE — PROGRESS NOTES
PATIENT VISIT FAMILY MEDICINE    CC:   Chief Complaint   Patient presents with    Follow-up    Knee Pain     Lt she fell its swelling and some pain about a month ago       HPI: Luz Duke  is a 62 y.o. female:    Patient is new to me.  Patient presents alone for annual.    About a month ago had a mechanical fall. Fell on left knee. Persistent swelling. When she walks has some pain.     Oncology Dr. Bteo Vazquez ([place])  Gynecology Dr. Guadarrama at [facility] follows PAP, Mammogram and Dexa  Gastroenterology Dr. Jameel June ([facility])    Answers submitted by the patient for this visit:  Review of Systems Questionnaire (Submitted on 2023)  activity change: No  unexpected weight change: No  neck pain: Yes  hearing loss: Yes  rhinorrhea: No  trouble swallowing: No  eye discharge: No  visual disturbance: No  chest tightness: No  wheezing: No  chest pain: No  palpitations: No  blood in stool: No  constipation: No  vomiting: No  diarrhea: No  polydipsia: No  polyuria: No  difficulty urinating: No  hematuria: No  menstrual problem: No  dysuria: No  joint swelling: No  arthralgias: No  headaches: No  weakness: No  confusion: No  dysphoric mood: No    PMHX:   Past Medical History:   Diagnosis Date    Breast cancer 2005    chemo/lumpectomy     Depression     Encounter for blood transfusion     GERD (gastroesophageal reflux disease)        PSHX:   Past Surgical History:   Procedure Laterality Date    BREAST SURGERY Left 2005    lumpectomy,chemo and radiation    BUNIONECTOMY Right 2016     SECTION      FOOT SURGERY Left     GASTRIC BYPASS  2004    TUBAL LIGATION         FAMHX:   Family History   Problem Relation Age of Onset    Cancer Father     Cancer Paternal Aunt     Cancer Paternal Uncle        SOCHX:   Social History     Socioeconomic History    Marital status:     Number of children: 1   Occupational History    Occupation: Worked for [employer]   Tobacco Use    Smoking status:  Never     Passive exposure: Never    Smokeless tobacco: Never   Substance and Sexual Activity    Alcohol use: Yes     Alcohol/week: 2.0 standard drinks     Types: 1 Cans of beer, 1 Glasses of wine per week     Comment: occ    Drug use: No    Sexual activity: Never   Social History Narrative    . Ally Clemons is a        ALLERGIES: Review of patient's allergies indicates:  No Known Allergies    MEDS:   Current Outpatient Medications:     ascorbic acid, vitamin C, (VITAMIN C) 1000 MG tablet, , Disp: , Rfl:     CALCIUM CARBONATE/VITAMIN D3 (CALTRATE WITH VITAMIN D3 ORAL), Take 1 tablet by mouth 2 (two) times daily., Disp: , Rfl:     cholecalciferol, vitamin D3, 125 mcg (5,000 unit) Tab, Take 5,000 Units by mouth once daily., Disp: , Rfl:     diphenhydrAMINE-acetaminophen (TYLENOL PM)  mg Tab, Take 1 tablet by mouth nightly as needed., Disp: , Rfl:     famotidine (PEPCID) 20 MG tablet, Take 20 mg by mouth 2 (two) times daily. as directed., Disp: , Rfl:     multivitamin (THERAGRAN) per tablet, Take 1 tablet by mouth once daily., Disp: , Rfl:     raloxifene (EVISTA) 60 mg tablet, Take 1 tablet (60 mg total) by mouth once daily., Disp: 90 tablet, Rfl: 3    triamcinolone acetonide 0.5% (KENALOG) 0.5 % Crea, Apply topically 2 (two) times daily., Disp: 30 g, Rfl: 0    EScitalopram oxalate (LEXAPRO) 10 MG tablet, Take 1 tablet (10 mg total) by mouth once daily., Disp: 90 tablet, Rfl: 3    LORATADINE ORAL, Take by mouth., Disp: , Rfl:     meloxicam (MOBIC) 7.5 MG tablet, Take 1 tablet (7.5 mg total) by mouth daily as needed for Pain., Disp: 30 tablet, Rfl: 0    MULTIVIT 33-MTFOLATE-NAC-CHROM ORAL, Take by mouth once daily., Disp: , Rfl:     NON FORMULARY MEDICATION, 500 mg 3 (three) times daily. tumeric, Disp: , Rfl:     spironolactone (ALDACTONE) 50 MG tablet, Take 1 tablet (50 mg total) by mouth once daily., Disp: 90 tablet, Rfl: 3    OBJECTIVE:   Vitals:    04/26/23 1331   BP: 110/64   BP  "Location: Left arm   Patient Position: Sitting   BP Method: Large (Manual)   Pulse: 82   SpO2: 97%   Weight: 111.9 kg (246 lb 11.1 oz)   Height: 5' 4" (1.626 m)     Body mass index is 42.35 kg/m².      Physical Exam  Vitals and nursing note reviewed.   Constitutional:       Appearance: Normal appearance.   HENT:      Head: Normocephalic.   Eyes:      General:         Right eye: No discharge.         Left eye: No discharge.      Extraocular Movements: Extraocular movements intact.   Cardiovascular:      Rate and Rhythm: Normal rate and regular rhythm.      Heart sounds: Normal heart sounds.   Pulmonary:      Effort: Pulmonary effort is normal.      Breath sounds: Normal breath sounds.   Musculoskeletal:      Comments: Mild edema noted inferiomedially to left knee. Tamika structures nontender to palpation   Skin:     Comments: No obvious rash on exposed skin   Neurological:      Mental Status: She is alert.   Psychiatric:         Behavior: Behavior normal.         LABS:   A1C:      CBC:  Recent Labs   Lab 04/21/23  0843   WBC 7.10   RBC 4.41   Hemoglobin 11.9 L   Hematocrit 37.4   Platelets 264   MCV 85   MCH 27.0   MCHC 31.8 L     CMP:  Recent Labs   Lab 04/21/23  0843   Glucose 87   Calcium 8.6 L   Albumin 3.6   Total Protein 6.4   Sodium 141   Potassium 4.2   CO2 29   Chloride 108   BUN 14   Creatinine 0.44 L   Alkaline Phosphatase 90   ALT 32   AST 27   Total Bilirubin 0.3     LIPIDS:  Recent Labs   Lab 04/21/23  0843   TSH 2.940   HDL 55   Cholesterol 149   Triglycerides 66   LDL Cholesterol 80.8   HDL/Cholesterol Ratio 36.9   Non-HDL Cholesterol 94   Total Cholesterol/HDL Ratio 2.7     TSH:  Recent Labs   Lab 04/21/23  0843   TSH 2.940         ASSESSMENT & PLAN:    Problem List Items Addressed This Visit      ANNUAL - Preventative cares discussed and updated as needed. Lifestyle modifications discussed as needed.        Cardiac/Vascular    Peripheral vascular disease, unspecified (Chronic) -  stable        " Oncology    History of breast cancer (Chronic)    Overview     - 2005  - left breast s/p chemo and radiation  - followed by Dr. George Flaherty           Relevant Orders    HME - OTHER       Endocrine    Class 3 severe obesity due to excess calories with serious comorbidity and body mass index (BMI) of 40.0 to 44.9 in adult (Chronic)    Overview     - discussed recommendation for diet, exercise and weight loss              Orthopedic    Osteopenia - Primary (Chronic)    Overview     - 10/27/2021 DEXA:  Osteopenia lumbar spine T-score -2.1.  Left femoral neck T-score -1.  Right femoral neck T-score-0.9.  Left total hip -0.1.  Right total hip -0.5.  There is a 6.8% risk of major osteoporotic fracture and 0.4 risk of hip fracture  - bone densities improved from 2019 DEXA  - okay to continue Evista (no need for additional fosamx stopped 11/6/21)   - fall prevention  - weight bearing exercises like walking, squats, stair and jogging if able  - over the counter vitamin D3 6752-7410 units daily  - dietary calcium 1200 mg per day with diet or supplements   - repeat bone density scan in 2-3 years              Other    Localized edema (Chronic)    Overview     - chronic left distal leg swelling since recurrent cellulitis  - recommend compression hose daily  - recommend trial off of diuretic or only use PRN and pt agreeable to trial            Relevant Medications    spironolactone (ALDACTONE) 50 MG tablet    meloxicam (MOBIC) 7.5 MG tablet    Other Relevant Orders    COMPRESSION STOCKINGS    COMPRESSION SLEEVE/SOCK FOR HOME USE    HME - OTHER    Ambulatory referral/consult to Physical/Occupational Therapy     Other Visit Diagnoses       Acute pain of left knee        Relevant Orders    X-Ray Knee Complete 4 or More Views Left    Mood disorder        Relevant Medications    EScitalopram oxalate (LEXAPRO) 10 MG tablet                Follow up in about 1 year (around 4/26/2024) for annual.      RTC/ED precautions discussed  where applicable.   Encouraged patient to let me know if there are any further questions/concerns.     Advise patient/caretaker to check with insurance regarding orders to avoid unexpected fees/costs.     The patient/caretaker indicates understanding of these issues and agrees with the plan.    Dr. Vanda Munguia MD  Family Medicine

## 2023-04-27 ENCOUNTER — PATIENT OUTREACH (OUTPATIENT)
Dept: ADMINISTRATIVE | Facility: HOSPITAL | Age: 63
End: 2023-04-27
Payer: COMMERCIAL

## 2023-08-14 NOTE — PROGRESS NOTES
Subjective:         Chief Complaint: Sinus Problem (Started thursday), Headache, and Fever (Since )     Luz Duke is a 62 y.o. female, patient of Vanda Munguia MD with dermatitis, PVD, obesity, GERD, osteopenia, insomnia, SIMONA, unknown to me, presents today with complaints of Sinus Problem (Started thursday), Headache, and Fever (Since )     Sinus Problem  This is a recurrent problem. The current episode started in the past 7 days (Symptoms started on last Thursday). The problem has been gradually worsening since onset. The maximum temperature recorded prior to her arrival was 101 - 101.9 F (101 was the highest). The fever has been present for 1 to 2 days. Her pain is at a severity of 6/10 (not that bad right now). The pain is moderate. Associated symptoms include congestion, coughing, ear pain (right ear), headaches, neck pain, sinus pressure and sneezing. Pertinent negatives include no chills, shortness of breath or sore throat. Past treatments include oral decongestants. The treatment provided no relief.       Past Medical History:   Diagnosis Date    Breast cancer 2005    chemo/lumpectomy     Depression     Encounter for blood transfusion     GERD (gastroesophageal reflux disease)        Past Surgical History:   Procedure Laterality Date    BREAST SURGERY Left 2005    lumpectomy,chemo and radiation    BUNIONECTOMY Right 2016     SECTION      FOOT SURGERY Left     GASTRIC BYPASS  2004    TUBAL LIGATION         Family History   Problem Relation Age of Onset    Cancer Father     Cancer Paternal Aunt     Cancer Paternal Uncle        Social History     Socioeconomic History    Marital status:     Number of children: 1   Occupational History    Occupation: Worked for [employer]   Tobacco Use    Smoking status: Never     Passive exposure: Never    Smokeless tobacco: Never   Substance and Sexual Activity    Alcohol use: Yes     Alcohol/week: 2.0 standard drinks of  "alcohol     Types: 1 Cans of beer, 1 Glasses of wine per week     Comment: occ    Drug use: No    Sexual activity: Never   Social History Narrative    . Ally Jennifer Clemons is a        Review of Systems   Constitutional:  Positive for fever. Negative for chills.   HENT:  Positive for congestion, ear pain (right ear), postnasal drip, rhinorrhea (blowing out greenish), sinus pressure, sinus pain and sneezing. Negative for sore throat.    Respiratory:  Positive for cough. Negative for shortness of breath.    Cardiovascular:  Negative for chest pain.   Musculoskeletal:  Positive for arthralgias, myalgias and neck pain.   Neurological:  Positive for headaches.         Objective:     Vitals:    08/15/23 0836   BP: 128/76   BP Location: Right arm   Patient Position: Sitting   Pulse: 84   Temp: 98.6 °F (37 °C)   SpO2: 97%   Weight: 108.8 kg (239 lb 12.8 oz)   Height: 5' 4" (1.626 m)          Physical Exam  Vitals reviewed.   Constitutional:       Appearance: Normal appearance. She is well-developed and well-groomed.   HENT:      Head: Normocephalic and atraumatic.      Right Ear: Tympanic membrane is erythematous and bulging.      Left Ear: Tympanic membrane is erythematous and bulging.      Nose:      Right Turbinates: Not swollen.      Left Turbinates: Swollen.      Right Sinus: Maxillary sinus tenderness present. No frontal sinus tenderness.      Left Sinus: Maxillary sinus tenderness present. No frontal sinus tenderness.      Mouth/Throat:      Pharynx: No pharyngeal swelling or posterior oropharyngeal erythema.   Eyes:      General: No scleral icterus.     Extraocular Movements: Extraocular movements intact.   Cardiovascular:      Rate and Rhythm: Normal rate and regular rhythm.      Heart sounds: Normal heart sounds.   Pulmonary:      Effort: Pulmonary effort is normal.      Breath sounds: Normal breath sounds.   Skin:     General: Skin is warm and dry.   Neurological:      General: No focal " "deficit present.      Mental Status: She is alert and oriented to person, place, and time.   Psychiatric:         Behavior: Behavior is cooperative.           Laboratory:  CBC:  No results for input(s): "WBC", "RBC", "HGB", "HCT", "PLT", "MCV", "MCH", "MCHC" in the last 2160 hours.  CMP:  No results for input(s): "GLU", "CALCIUM", "ALBUMIN", "PROT", "NA", "K", "CO2", "CL", "BUN", "ALKPHOS", "ALT", "AST", "BILITOT" in the last 2160 hours.    Invalid input(s): "CREATININ"  URINALYSIS:  No results for input(s): "COLORU", "CLARITYU", "SPECGRAV", "PHUR", "PROTEINUA", "GLUCOSEU", "BILIRUBINCON", "BLOODU", "WBCU", "RBCU", "BACTERIA", "MUCUS", "NITRITE", "LEUKOCYTESUR", "UROBILINOGEN", "HYALINECASTS" in the last 2160 hours.   LIPIDS:  No results for input(s): "TSH", "HDL", "CHOL", "TRIG", "LDLCALC", "CHOLHDL", "NONHDLCHOL", "TOTALCHOLEST" in the last 2160 hours.  TSH:  No results for input(s): "TSH" in the last 2160 hours.  A1C:  No results for input(s): "HGBA1C" in the last 2160 hours.    Assessment:         ICD-10-CM ICD-9-CM   1. Fever, unspecified fever cause  R50.9 780.60   2. Body aches  R52 780.96   3. Cough, unspecified type  R05.9 786.2   4. Bilateral acute serous otitis media, recurrence not specified  H65.03 381.01   5. Sinusitis, unspecified chronicity, unspecified location  J32.9 473.9       Plan:       Fever, unspecified fever cause  Body aches  -     POCT COVID-19 Rapid Screening  -     POCT Influenza A/B  Flu and COVID negative.     Cough, unspecified type  -     benzonatate (TESSALON) 100 MG capsule; Take 1 capsule (100 mg total) by mouth 3 (three) times daily as needed for Cough.  Dispense: 30 capsule; Refill: 0    Bilateral acute serous otitis media, recurrence not specified  -     amoxicillin-clavulanate 875-125mg (AUGMENTIN) 875-125 mg per tablet; Take 1 tablet by mouth every 12 (twelve) hours. for 5 days  Dispense: 10 tablet; Refill: 0    Sinusitis, unspecified chronicity, unspecified location  -     " amoxicillin-clavulanate 875-125mg (AUGMENTIN) 875-125 mg per tablet; Take 1 tablet by mouth every 12 (twelve) hours. for 5 days  Dispense: 10 tablet; Refill: 0  -     fluticasone propionate (FLONASE) 50 mcg/actuation nasal spray; 1 spray (50 mcg total) by Each Nostril route once daily.  Dispense: 11.1 mL; Refill: 0  -     azelastine (ASTELIN) 137 mcg (0.1 %) nasal spray; 1 spray (137 mcg total) by Nasal route 2 (two) times daily.  Dispense: 30 mL; Refill: 0  -take Mucinex-D as directed until symptoms resolve  -instructed on proper use of nasal sprays   -take antibiotics with food to prevent upset stomach  -Take OTC Tylenol or Ibuprofen for fever  -Rest   -Increase water/fluid intake  -drink hot or cold fluids, use throat lozenges for sore throat      If symptoms worsen, go to ER.  If symptoms do not improve, return to clinic.   Keep appointments with all specialists.     Patient verbalizes understanding and agrees with current treatment plan.    Follow up if symptoms worsen or fail to improve.     Patient's Medications   New Prescriptions    AMOXICILLIN-CLAVULANATE 875-125MG (AUGMENTIN) 875-125 MG PER TABLET    Take 1 tablet by mouth every 12 (twelve) hours. for 5 days    AZELASTINE (ASTELIN) 137 MCG (0.1 %) NASAL SPRAY    1 spray (137 mcg total) by Nasal route 2 (two) times daily.    BENZONATATE (TESSALON) 100 MG CAPSULE    Take 1 capsule (100 mg total) by mouth 3 (three) times daily as needed for Cough.    FLUTICASONE PROPIONATE (FLONASE) 50 MCG/ACTUATION NASAL SPRAY    1 spray (50 mcg total) by Each Nostril route once daily.   Previous Medications    ASCORBIC ACID, VITAMIN C, (VITAMIN C) 1000 MG TABLET        CALCIUM CARBONATE/VITAMIN D3 (CALTRATE WITH VITAMIN D3 ORAL)    Take 1 tablet by mouth 2 (two) times daily.    CHOLECALCIFEROL, VITAMIN D3, 125 MCG (5,000 UNIT) TAB    Take 5,000 Units by mouth once daily.    DIPHENHYDRAMINE-ACETAMINOPHEN (TYLENOL PM)  MG TAB    Take 1 tablet by mouth nightly as  needed.    ESCITALOPRAM OXALATE (LEXAPRO) 10 MG TABLET    Take 1 tablet (10 mg total) by mouth once daily.    FAMOTIDINE (PEPCID) 20 MG TABLET    Take 20 mg by mouth 2 (two) times daily. as directed.    LORATADINE ORAL    Take by mouth.    MELOXICAM (MOBIC) 7.5 MG TABLET    Take 1 tablet (7.5 mg total) by mouth daily as needed for Pain.    MULTIVIT 33-MTFOLATE-NAC-CHROM ORAL    Take by mouth once daily.    MULTIVITAMIN (THERAGRAN) PER TABLET    Take 1 tablet by mouth once daily.    NON FORMULARY MEDICATION    500 mg 3 (three) times daily. tumeric    RALOXIFENE (EVISTA) 60 MG TABLET    Take 1 tablet (60 mg total) by mouth once daily.    SPIRONOLACTONE (ALDACTONE) 50 MG TABLET    Take 1 tablet (50 mg total) by mouth once daily.    TRIAMCINOLONE ACETONIDE 0.5% (KENALOG) 0.5 % CREA    Apply topically 2 (two) times daily.    TRIAMCINOLONE ACETONIDE 0.5% (KENALOG) 0.5 % CREA    Apply topically three times daily   Modified Medications    No medications on file   Discontinued Medications    No medications on file         Sammi Lazo NP

## 2023-08-15 ENCOUNTER — OFFICE VISIT (OUTPATIENT)
Dept: FAMILY MEDICINE | Facility: CLINIC | Age: 63
End: 2023-08-15
Payer: MEDICARE

## 2023-08-15 VITALS
HEART RATE: 84 BPM | TEMPERATURE: 99 F | OXYGEN SATURATION: 97 % | BODY MASS INDEX: 40.94 KG/M2 | HEIGHT: 64 IN | WEIGHT: 239.81 LBS | DIASTOLIC BLOOD PRESSURE: 76 MMHG | SYSTOLIC BLOOD PRESSURE: 128 MMHG

## 2023-08-15 DIAGNOSIS — J32.9 SINUSITIS, UNSPECIFIED CHRONICITY, UNSPECIFIED LOCATION: ICD-10-CM

## 2023-08-15 DIAGNOSIS — R05.9 COUGH, UNSPECIFIED TYPE: ICD-10-CM

## 2023-08-15 DIAGNOSIS — R52 BODY ACHES: ICD-10-CM

## 2023-08-15 DIAGNOSIS — R50.9 FEVER, UNSPECIFIED FEVER CAUSE: Primary | ICD-10-CM

## 2023-08-15 DIAGNOSIS — H65.03 BILATERAL ACUTE SEROUS OTITIS MEDIA, RECURRENCE NOT SPECIFIED: ICD-10-CM

## 2023-08-15 LAB
CTP QC/QA: YES
CTP QC/QA: YES
FLUAV AG NPH QL: NEGATIVE
FLUBV AG NPH QL: NEGATIVE
SARS-COV-2 RDRP RESP QL NAA+PROBE: NEGATIVE

## 2023-08-15 PROCEDURE — 99999 PR PBB SHADOW E&M-EST. PATIENT-LVL V: CPT | Mod: PBBFAC,,,

## 2023-08-15 PROCEDURE — 99999 PR PBB SHADOW E&M-EST. PATIENT-LVL V: ICD-10-PCS | Mod: PBBFAC,,,

## 2023-08-15 PROCEDURE — 99214 PR OFFICE/OUTPT VISIT, EST, LEVL IV, 30-39 MIN: ICD-10-PCS | Mod: S$PBB,,,

## 2023-08-15 PROCEDURE — 99999PBSHW POCT INFLUENZA A/B: ICD-10-PCS | Mod: PBBFAC,,,

## 2023-08-15 PROCEDURE — 87635 SARS-COV-2 COVID-19 AMP PRB: CPT | Mod: PBBFAC,PN

## 2023-08-15 PROCEDURE — 99215 OFFICE O/P EST HI 40 MIN: CPT | Mod: PBBFAC,PN

## 2023-08-15 PROCEDURE — 99999PBSHW POCT INFLUENZA A/B: Mod: PBBFAC,,,

## 2023-08-15 PROCEDURE — 99214 OFFICE O/P EST MOD 30 MIN: CPT | Mod: S$PBB,,,

## 2023-08-15 PROCEDURE — 87502 INFLUENZA DNA AMP PROBE: CPT | Mod: PBBFAC,PN

## 2023-08-15 PROCEDURE — 99999PBSHW: Mod: PBBFAC,,,

## 2023-08-15 RX ORDER — AMOXICILLIN AND CLAVULANATE POTASSIUM 875; 125 MG/1; MG/1
1 TABLET, FILM COATED ORAL EVERY 12 HOURS
Qty: 10 TABLET | Refills: 0 | Status: SHIPPED | OUTPATIENT
Start: 2023-08-15 | End: 2023-08-20

## 2023-08-15 RX ORDER — FLUTICASONE PROPIONATE 50 MCG
1 SPRAY, SUSPENSION (ML) NASAL DAILY
Qty: 11.1 ML | Refills: 0 | Status: SHIPPED | OUTPATIENT
Start: 2023-08-15

## 2023-08-15 RX ORDER — BENZONATATE 100 MG/1
100 CAPSULE ORAL 3 TIMES DAILY PRN
Qty: 30 CAPSULE | Refills: 0 | Status: SHIPPED | OUTPATIENT
Start: 2023-08-15 | End: 2023-08-25

## 2023-08-15 RX ORDER — AZELASTINE 1 MG/ML
1 SPRAY, METERED NASAL 2 TIMES DAILY
Qty: 30 ML | Refills: 0 | Status: SHIPPED | OUTPATIENT
Start: 2023-08-15 | End: 2024-08-14

## 2023-08-15 NOTE — PATIENT INSTRUCTIONS
-take Mucinex-D as directed until symptoms resolve  -take antibiotics with food to prevent upset stomach  -Take OTC Tylenol or Ibuprofen for fever  -Rest   -Increase water/fluid intake  -drink hot or cold fluids, use throat lozenges for sore throat  -use tessalon as needed for cough  -If symptoms worsen, go to ER/Urgent care

## 2023-09-01 DIAGNOSIS — M85.89 OSTEOPENIA OF MULTIPLE SITES: ICD-10-CM

## 2023-09-01 NOTE — TELEPHONE ENCOUNTER
Please see the attached refill request.   PRINCIPAL DISCHARGE DIAGNOSIS  Diagnosis: CAD in native artery  Assessment and Plan of Treatment:       SECONDARY DISCHARGE DIAGNOSES  Diagnosis: HTN (hypertension)  Assessment and Plan of Treatment:

## 2023-09-03 RX ORDER — RALOXIFENE HYDROCHLORIDE 60 MG/1
60 TABLET, FILM COATED ORAL
Qty: 90 TABLET | Refills: 0 | Status: SHIPPED | OUTPATIENT
Start: 2023-09-03 | End: 2023-11-22 | Stop reason: SDUPTHER

## 2023-11-22 DIAGNOSIS — M85.89 OSTEOPENIA OF MULTIPLE SITES: ICD-10-CM

## 2023-11-26 DIAGNOSIS — M85.89 OSTEOPENIA OF MULTIPLE SITES: ICD-10-CM

## 2023-11-27 RX ORDER — RALOXIFENE HYDROCHLORIDE 60 MG/1
60 TABLET, FILM COATED ORAL DAILY
Qty: 90 TABLET | Refills: 3 | Status: SHIPPED | OUTPATIENT
Start: 2023-11-27

## 2024-02-20 ENCOUNTER — PATIENT MESSAGE (OUTPATIENT)
Dept: ADMINISTRATIVE | Facility: HOSPITAL | Age: 64
End: 2024-02-20
Payer: MEDICARE

## 2024-02-21 ENCOUNTER — PATIENT OUTREACH (OUTPATIENT)
Dept: ADMINISTRATIVE | Facility: HOSPITAL | Age: 64
End: 2024-02-21
Payer: MEDICARE

## 2024-02-21 LAB — BCS RECOMMENDATION EXT: NORMAL

## 2024-02-21 NOTE — PROGRESS NOTES
Population Health Chart Review & Patient Outreach Details    Outreach Performed: YES Portal    Additional [facility] Health Notes:           Updates Requested / Reviewed:      Updated Care Coordination Note, External Sources: DIS, and Care Team Updated         Health Maintenance Topics Overdue:    Health Maintenance Due   Topic Date Due    Hemoglobin A1c (Diabetic Prevention Screening)  Never done    RSV Vaccine (Age 60+ and Pregnant patients) (1 - 1-dose 60+ series) Never done    Pneumococcal Vaccines (Age 65+) (2 of 2 - PCV) 08/21/2023    COVID-19 Vaccine (5 - 2023-24 season) 09/01/2023         Health Maintenance Topic(s) Outreach Outcomes & Actions Taken:    Breast Cancer Screening - Outreach Outcomes & Actions Taken  : External Records Uploaded & Care Team Updated if Applicable    Breast Cancer Screening - Outreach Outcomes & Actions Taken  : External Records Uploaded & Care Team Updated if Applicable

## 2024-04-30 ENCOUNTER — OFFICE VISIT (OUTPATIENT)
Dept: FAMILY MEDICINE | Facility: CLINIC | Age: 64
End: 2024-04-30
Payer: MEDICARE

## 2024-04-30 VITALS
WEIGHT: 253.63 LBS | DIASTOLIC BLOOD PRESSURE: 86 MMHG | SYSTOLIC BLOOD PRESSURE: 120 MMHG | BODY MASS INDEX: 43.3 KG/M2 | OXYGEN SATURATION: 97 % | HEIGHT: 64 IN | HEART RATE: 85 BPM

## 2024-04-30 DIAGNOSIS — R79.9 ABNORMAL FINDING OF BLOOD CHEMISTRY, UNSPECIFIED: ICD-10-CM

## 2024-04-30 DIAGNOSIS — Z85.3 HISTORY OF BREAST CANCER: Chronic | ICD-10-CM

## 2024-04-30 DIAGNOSIS — R01.1 SYSTOLIC MURMUR: ICD-10-CM

## 2024-04-30 DIAGNOSIS — Z12.31 BREAST CANCER SCREENING BY MAMMOGRAM: ICD-10-CM

## 2024-04-30 DIAGNOSIS — E61.1 IRON DEFICIENCY: ICD-10-CM

## 2024-04-30 DIAGNOSIS — I73.9 PERIPHERAL VASCULAR DISEASE, UNSPECIFIED: Primary | Chronic | ICD-10-CM

## 2024-04-30 DIAGNOSIS — Z13.6 ENCOUNTER FOR LIPID SCREENING FOR CARDIOVASCULAR DISEASE: ICD-10-CM

## 2024-04-30 DIAGNOSIS — F39 MOOD DISORDER: ICD-10-CM

## 2024-04-30 DIAGNOSIS — Z78.0 POSTMENOPAUSAL: ICD-10-CM

## 2024-04-30 DIAGNOSIS — H91.90 HEARING LOSS, UNSPECIFIED HEARING LOSS TYPE, UNSPECIFIED LATERALITY: ICD-10-CM

## 2024-04-30 DIAGNOSIS — E66.01 CLASS 3 SEVERE OBESITY DUE TO EXCESS CALORIES WITH SERIOUS COMORBIDITY AND BODY MASS INDEX (BMI) OF 40.0 TO 44.9 IN ADULT: Chronic | ICD-10-CM

## 2024-04-30 DIAGNOSIS — R60.0 LOCALIZED EDEMA: Chronic | ICD-10-CM

## 2024-04-30 DIAGNOSIS — Z13.220 ENCOUNTER FOR LIPID SCREENING FOR CARDIOVASCULAR DISEASE: ICD-10-CM

## 2024-04-30 DIAGNOSIS — M85.89 OSTEOPENIA OF MULTIPLE SITES: Chronic | ICD-10-CM

## 2024-04-30 PROCEDURE — 99215 OFFICE O/P EST HI 40 MIN: CPT | Mod: PBBFAC,PN | Performed by: FAMILY MEDICINE

## 2024-04-30 PROCEDURE — 99999 PR PBB SHADOW E&M-EST. PATIENT-LVL V: CPT | Mod: PBBFAC,,, | Performed by: FAMILY MEDICINE

## 2024-04-30 PROCEDURE — 99214 OFFICE O/P EST MOD 30 MIN: CPT | Mod: S$PBB,,, | Performed by: FAMILY MEDICINE

## 2024-04-30 RX ORDER — CALCIUM CARBONATE 600 MG
1 TABLET ORAL 2 TIMES DAILY WITH MEALS
COMMUNITY

## 2024-04-30 NOTE — PROGRESS NOTES
PATIENT VISIT FAMILY MEDICINE    CC:   Chief Complaint   Patient presents with    Follow-up       HPI: Luz Duke  is a 63 y.o. female:    Patient is known to me.  Patient presents routine follow up on chronic conditions    Patient doing well overall, taking medications as prescribed and with no acute concerns.     Answers submitted by the patient for this visit:  Review of Systems Questionnaire (Submitted on 2024)  activity change: No  unexpected weight change: No  neck pain: No  hearing loss: Yes  rhinorrhea: No  trouble swallowing: No  eye discharge: No  visual disturbance: No  chest tightness: No  wheezing: No  chest pain: No  palpitations: No  blood in stool: No  constipation: No  vomiting: No  diarrhea: No  polydipsia: No  polyuria: No  difficulty urinating: No  hematuria: No  menstrual problem: No  dysuria: No  joint swelling: No  arthralgias: No  headaches: No  weakness: No  confusion: No  dysphoric mood: No      PMHX:   Past Medical History:   Diagnosis Date    Breast cancer 2005    chemo/lumpectomy     Depression     Encounter for blood transfusion     GERD (gastroesophageal reflux disease)        PSHX:   Past Surgical History:   Procedure Laterality Date    BREAST SURGERY Left 2005    lumpectomy,chemo and radiation    BUNIONECTOMY Right 2016     SECTION      FOOT SURGERY Left     GASTRIC BYPASS  2004    TUBAL LIGATION         FAMHX:   Family History   Problem Relation Name Age of Onset    Cancer Father      Cancer Paternal Aunt      Cancer Paternal Uncle         SOCHX:   Social History     Socioeconomic History    Marital status:     Number of children: 1   Occupational History    Occupation: Worked for [employer]   Tobacco Use    Smoking status: Never     Passive exposure: Never    Smokeless tobacco: Never   Substance and Sexual Activity    Alcohol use: Yes     Alcohol/week: 2.0 standard drinks of alcohol     Types: 1 Cans of beer, 1 Glasses of wine per week      Comment: occ    Drug use: No    Sexual activity: Never   Social History Narrative    . Salimaarpan Clemons is a      Social Determinants of Health     Financial Resource Strain: Low Risk  (10/26/2022)    Received from [facility] [employer] [facility]    Overall Financial Resource Strain (CARDIA)     Difficulty of Paying Living Expenses: Not hard at all   Food Insecurity: No Food Insecurity (10/26/2022)    Received from [facility] [employer] [facility]    Hunger Vital Sign     Worried About Running Out of Food in the Last Year: Never true     Ran Out of Food in the Last Year: Never true   Transportation Needs: No Transportation Needs (10/26/2022)    Received from [employer] [facility]    PRAPARE - Transportation     Lack of Transportation (Medical): No     Lack of Transportation (Non-Medical): No   Physical Activity: Insufficiently Active (10/26/2022)    Received from [facility] [employer] [facility]    Exercise Vital Sign     Days of Exercise per Week: 3 days     Minutes of Exercise per Session: 10 min   Stress: No Stress Concern Present (10/26/2022)    Received from [employer] [facility]    [employer] [place] [employer] - Occupational Stress Questionnaire     Feeling of Stress : Not at all   Housing Stability: Low Risk  (10/26/2022)    Received from [facility]    Housing Stability Vital Sign     Unable to Pay for Housing in the Last Year: No     Number of Places Lived in the Last Year: 1     In the last 12 months, was there a time when you did not have a steady place to sleep or slept in a shelter (including now)?: No       ALLERGIES: Review of patient's allergies indicates:  No Known Allergies    MEDS:   Current Outpatient Medications:     ascorbic acid, vitamin C, (VITAMIN C) 1000 MG tablet, , Disp: , Rfl:     azelastine (ASTELIN) 137 mcg (0.1 %) nasal spray, 1 spray (137 mcg total) by Nasal route 2 (two) times daily., Disp: 30 mL, Rfl: 0    cholecalciferol, vitamin D3, 125 mcg  "(5,000 unit) Tab, Take 5,000 Units by mouth once daily., Disp: , Rfl:     diphenhydrAMINE-acetaminophen (TYLENOL PM)  mg Tab, Take 1 tablet by mouth nightly as needed., Disp: , Rfl:     EScitalopram oxalate (LEXAPRO) 10 MG tablet, Take 1 tablet (10 mg total) by mouth once daily., Disp: 90 tablet, Rfl: 3    famotidine (PEPCID) 20 MG tablet, Take 20 mg by mouth 2 (two) times daily. as directed., Disp: , Rfl:     fluticasone propionate (FLONASE) 50 mcg/actuation nasal spray, 1 spray (50 mcg total) by Each Nostril route once daily., Disp: 11.1 mL, Rfl: 0    LORATADINE ORAL, Take by mouth., Disp: , Rfl:     MULTIVIT 33-MTFOLATE-NAC-CHROM ORAL, Take by mouth once daily., Disp: , Rfl:     multivitamin (THERAGRAN) per tablet, Take 1 tablet by mouth once daily., Disp: , Rfl:     NON FORMULARY MEDICATION, 500 mg 3 (three) times daily. tumeric, Disp: , Rfl:     raloxifene (EVISTA) 60 mg tablet, Take 1 tablet (60 mg total) by mouth once daily., Disp: 90 tablet, Rfl: 3    spironolactone (ALDACTONE) 50 MG tablet, Take 1 tablet (50 mg total) by mouth once daily., Disp: 90 tablet, Rfl: 3    triamcinolone acetonide 0.5% (KENALOG) 0.5 % Crea, Apply topically 2 (two) times daily., Disp: 30 g, Rfl: 0    triamcinolone acetonide 0.5% (KENALOG) 0.5 % Crea, Apply topically three times daily, Disp: 15 g, Rfl: 0    calcium carbonate (OS-LIANET) 600 mg calcium (1,500 mg) Tab, Take 1 tablet by mouth 2 (two) times daily with meals., Disp: , Rfl:     OBJECTIVE:   Vitals:    04/30/24 1001   BP: 120/86   BP Location: Right arm   Patient Position: Sitting   BP Method: Large (Manual)   Pulse: 85   SpO2: 97%   Weight: 115.1 kg (253 lb 10.2 oz)   Height: 5' 4" (1.626 m)     Body mass index is 43.54 kg/m².      Physical Exam  Vitals and nursing note reviewed.   Constitutional:       Appearance: Normal appearance.   HENT:      Head: Normocephalic.   Eyes:      General:         Right eye: No discharge.         Left eye: No discharge.      Extraocular " Movements: Extraocular movements intact.   Cardiovascular:      Rate and Rhythm: Normal rate and regular rhythm.      Heart sounds: Murmur heard.   Pulmonary:      Effort: Pulmonary effort is normal.      Breath sounds: Normal breath sounds.   Abdominal:      General: Bowel sounds are normal. There is no distension.      Palpations: Abdomen is soft. There is no mass.      Tenderness: There is no abdominal tenderness. There is no guarding or rebound.      Hernia: No hernia is present.   Skin:     Comments: No obvious rash on exposed skin   Neurological:      Mental Status: She is alert.   Psychiatric:         Behavior: Behavior normal.           LABS:   A1C:      CBC:  Recent Labs   Lab 04/30/24  1112   WBC 8.63   RBC 4.50   Hemoglobin 10.5 L   Hematocrit 34.1 L   Platelets 325   MCV 76 L   MCH 23.3 L   MCHC 30.8 L     CMP:  Recent Labs   Lab 04/30/24  1112   Glucose 84   Calcium 8.9   Albumin 3.3 L   Total Protein 6.5   Sodium 138   Potassium 4.3   CO2 24   Chloride 107   BUN 15   Creatinine 0.7   Alkaline Phosphatase 116   ALT 22   AST 17   Total Bilirubin 0.3     LIPIDS:  Recent Labs   Lab 04/21/23  0843 04/30/24  1112   TSH 2.940  --    HDL 55 58   Cholesterol 149 159   Triglycerides 66 93   LDL Cholesterol 80.8 82.4   HDL/Cholesterol Ratio 36.9 36.5   Non-HDL Cholesterol 94 101   Total Cholesterol/HDL Ratio 2.7 2.7     TSH:  Recent Labs   Lab 04/21/23  0843   TSH 2.940         ASSESSMENT & PLAN:    Problem List Items Addressed This Visit          Psychiatric    Mood disorder    Overview     Stable. Continue current medical therapy           Relevant Orders    Hemoglobin A1C    CBC Auto Differential    Comprehensive Metabolic Panel    Hemoglobin A1C    Lipid Panel       ENT    Hearing loss    Overview     Per family. Normal exam.          Relevant Orders    Ambulatory referral/consult to ENT    Ambulatory referral/consult to Audiology    Hemoglobin A1C    CBC Auto Differential    Comprehensive Metabolic Panel     Hemoglobin A1C    Lipid Panel       Cardiac/Vascular    Peripheral vascular disease, unspecified - Primary (Chronic)    Overview     Stable. Continue current medical therapy         Relevant Orders    CBC Auto Differential (Completed)    Comprehensive Metabolic Panel (Completed)    Hemoglobin A1C    Lipid Panel (Completed)    CBC Auto Differential    Comprehensive Metabolic Panel    Hemoglobin A1C    Lipid Panel    Systolic murmur (Chronic)    Overview     No prior hx. Check echo         Relevant Orders    Echo    CBC Auto Differential    Comprehensive Metabolic Panel    Hemoglobin A1C    Lipid Panel       Oncology    History of breast cancer (Chronic)    Overview     - 2005  - left breast s/p chemo and radiation  - followed by Dr. George Flaherty         Relevant Orders    Hemoglobin A1C    CBC Auto Differential    Comprehensive Metabolic Panel    Hemoglobin A1C    Lipid Panel    Iron deficiency    Overview     Per blood bank. Will check labs         Relevant Orders    Iron and TIBC    Ferritin    CBC Auto Differential    Comprehensive Metabolic Panel    Hemoglobin A1C    Lipid Panel       Endocrine    Class 3 severe obesity due to excess calories with serious comorbidity and body mass index (BMI) of 40.0 to 44.9 in adult (Chronic)    Overview     Body mass index is 43.54 kg/m².    - discussed recommendation for diet, exercise and weight loss         Relevant Orders    Hemoglobin A1C    CBC Auto Differential    Comprehensive Metabolic Panel    Hemoglobin A1C    Lipid Panel       Orthopedic    Osteopenia (Chronic)    Overview     - 10/27/2021 DEXA:  Osteopenia lumbar spine T-score -2.1.  Left femoral neck T-score -1.  Right femoral neck T-score-0.9.  Left total hip -0.1.  Right total hip -0.5.  There is a 6.8% risk of major osteoporotic fracture and 0.4 risk of hip fracture  - bone densities improved from 2019 DEXA  - okay to continue Evista (no need for additional fosamx stopped 11/6/21)   - fall prevention  -  weight bearing exercises like walking, squats, stair and jogging if able  - over the counter vitamin D3 0729-4824 units daily  - dietary calcium 1200 mg per day with diet or supplements   - repeat bone density scan in 2-3 years           Relevant Orders    DXA Bone Density Axial Skeleton 1 or more sites    CBC Auto Differential (Completed)    Comprehensive Metabolic Panel (Completed)    Hemoglobin A1C    CBC Auto Differential    Comprehensive Metabolic Panel    Hemoglobin A1C    Lipid Panel       Other    Localized edema (Chronic)    Overview     - chronic left distal leg swelling since recurrent cellulitis  - recommend compression hose daily  - recommend trial off of diuretic or only use PRN and pt agreeable to trial          Relevant Orders    Hemoglobin A1C    CBC Auto Differential    Comprehensive Metabolic Panel    Hemoglobin A1C    Lipid Panel     Other Visit Diagnoses       Breast cancer screening by mammogram        Relevant Orders    Mammo Digital Screening Bilat w/ Gopal    Hemoglobin A1C    CBC Auto Differential    Comprehensive Metabolic Panel    Hemoglobin A1C    Lipid Panel    Postmenopausal        Relevant Orders    DXA Bone Density Axial Skeleton 1 or more sites    Hemoglobin A1C    CBC Auto Differential    Comprehensive Metabolic Panel    Hemoglobin A1C    Lipid Panel    Encounter for lipid screening for cardiovascular disease        Relevant Orders    Lipid Panel (Completed)    CBC Auto Differential    Comprehensive Metabolic Panel    Hemoglobin A1C    Lipid Panel    Abnormal finding of blood chemistry, unspecified    on previous labs     Relevant Orders    Hemoglobin A1C    CBC Auto Differential    Comprehensive Metabolic Panel    Hemoglobin A1C    Lipid Panel              Follow up in about 1 year (around 4/30/2025) for check up.      RTC/ED precautions discussed where applicable.   Encouraged patient to let me know if there are any further questions/concerns.     Advise patient/caretaker to check  with insurance regarding orders to avoid unexpected fees/costs.     The patient/caretaker indicates understanding of these issues and agrees with the plan.    Dr. Vanda Munguia MD  Family Medicine

## 2024-05-02 ENCOUNTER — PATIENT MESSAGE (OUTPATIENT)
Dept: FAMILY MEDICINE | Facility: CLINIC | Age: 64
End: 2024-05-02
Payer: MEDICARE

## 2024-05-07 ENCOUNTER — CLINICAL SUPPORT (OUTPATIENT)
Dept: OTOLARYNGOLOGY | Facility: CLINIC | Age: 64
End: 2024-05-07
Payer: MEDICARE

## 2024-05-07 ENCOUNTER — OFFICE VISIT (OUTPATIENT)
Dept: OTOLARYNGOLOGY | Facility: CLINIC | Age: 64
End: 2024-05-07
Payer: MEDICARE

## 2024-05-07 VITALS
WEIGHT: 252.19 LBS | HEART RATE: 76 BPM | DIASTOLIC BLOOD PRESSURE: 71 MMHG | BODY MASS INDEX: 43.29 KG/M2 | SYSTOLIC BLOOD PRESSURE: 101 MMHG

## 2024-05-07 DIAGNOSIS — H90.3 SENSORINEURAL HEARING LOSS (SNHL) OF BOTH EARS: ICD-10-CM

## 2024-05-07 DIAGNOSIS — H90.3 SENSORINEURAL HEARING LOSS (SNHL) OF BOTH EARS: Primary | ICD-10-CM

## 2024-05-07 PROCEDURE — 99213 OFFICE O/P EST LOW 20 MIN: CPT | Mod: S$PBB,,, | Performed by: NURSE PRACTITIONER

## 2024-05-07 PROCEDURE — 99999 PR PBB SHADOW E&M-EST. PATIENT-LVL II: CPT | Mod: PBBFAC,,,

## 2024-05-07 PROCEDURE — 99999 PR PBB SHADOW E&M-EST. PATIENT-LVL III: CPT | Mod: PBBFAC,,, | Performed by: NURSE PRACTITIONER

## 2024-05-07 PROCEDURE — 99212 OFFICE O/P EST SF 10 MIN: CPT | Mod: PBBFAC,PN,25

## 2024-05-07 PROCEDURE — 99213 OFFICE O/P EST LOW 20 MIN: CPT | Mod: PBBFAC,27,PN | Performed by: NURSE PRACTITIONER

## 2024-05-07 PROCEDURE — 92567 TYMPANOMETRY: CPT | Mod: PBBFAC,PN

## 2024-05-07 PROCEDURE — 92557 COMPREHENSIVE HEARING TEST: CPT | Mod: PBBFAC,PN

## 2024-05-07 NOTE — PROGRESS NOTES
Chief Complaint   Patient presents with    Hearing Loss       HPI:  Luz Duke is a very pleasant 63 y.o. female referred to me by Dr. Vanda Munguia in consultation for evaluation of hearing loss.  She does not perceive any hearing loss but her family has concerns. She has not noted any difference in hearing between the ears, with either ear being the worse hearing ear. She has not noted any tinnitus in either ear. She has not had any recent issues with ear pain or ear drainage. She denies a family history of hearing loss, and has not had any previous otologic surgery. She denies a history of significant loud noise exposure. She denies issues with dizziness.        Past Medical History:   Diagnosis Date    Breast cancer 2005    chemo/lumpectomy     Depression     Encounter for blood transfusion     GERD (gastroesophageal reflux disease)      Social History     Socioeconomic History    Marital status:     Number of children: 1   Occupational History    Occupation: Worked for [employer]   Tobacco Use    Smoking status: Never     Passive exposure: Never    Smokeless tobacco: Never   Substance and Sexual Activity    Alcohol use: Yes     Alcohol/week: 2.0 standard drinks of alcohol     Types: 1 Cans of beer, 1 Glasses of wine per week     Comment: occ    Drug use: No    Sexual activity: Never   Social History Narrative    . Ally Clemons is a      Social Determinants of Health     Financial Resource Strain: Low Risk  (10/26/2022)    Received from [facility] [place] [employer], [facility]    Overall Financial Resource Strain (CARDIA)     Difficulty of Paying Living Expenses: Not hard at all   Food Insecurity: No Food Insecurity (10/26/2022)    Received from [facility] [place] [employer], [facility]    Hunger Vital Sign     Worried About Running Out of Food in the Last Year: Never true     Ran Out of Food in the Last Year: Never true   Transportation Needs: No Transportation Needs (10/26/2022)     Received from [facility] [employer], [facility]    PRAPARE - Transportation     Lack of Transportation (Medical): No     Lack of Transportation (Non-Medical): No   Physical Activity: Insufficiently Active (10/26/2022)    Received from [facility]    Exercise Vital Sign     Days of Exercise per Week: 3 days     Minutes of Exercise per Session: 10 min   Stress: No Stress Concern Present (10/26/2022)    Received from [facility]    [place] [employer] - Occupational Stress Questionnaire     Feeling of Stress : Not at all   Housing Stability: Low Risk  (10/26/2022)    Received from [facility]    Housing Stability Vital Sign     Unable to Pay for Housing in the Last Year: No     Number of Places Lived in the Last Year: 1     In the last 12 months, was there a time when you did not have a steady place to sleep or slept in a shelter (including now)?: No      Past Surgical History:   Procedure Laterality Date    BREAST SURGERY Left 2005    lumpectomy,chemo and radiation    BUNIONECTOMY Right 2016     SECTION      FOOT SURGERY Left     GASTRIC BYPASS  2004    TUBAL LIGATION       Family History   Problem Relation Name Age of Onset    Cancer Father      Cancer Paternal Aunt      Cancer Paternal Uncle           Review of Systems  General: negative for chills, fever or weight loss  Psychological: negative for mood changes or depression  Ophthalmic: negative for blurry vision, photophobia or eye pain  ENT: see HPI  Respiratory: no cough, shortness of breath, or wheezing  Cardiovascular: no chest pain or dyspnea on exertion  Gastrointestinal: no abdominal pain, change in bowel habits, or black/ bloody stools  Musculoskeletal: negative for gait disturbance or muscular weakness  Neurological: no syncope or seizures; no ataxia  Dermatological: negative for puritis,  rash and jaundice  Hematologic/lymphatic: no easy bruising, no new lumps or bumps      Physical  Exam:    Vitals:    05/07/24 0832   BP: 101/71   Pulse: 76       Constitutional: Well appearing / communicating without difficutly.  NAD.  Eyes: EOM I Bilaterally  Head/Face: Normocephalic.  Negative paranasal sinus pressure/tenderness.  Salivary glands WNL.  House Brackmann I Bilaterally.    Right Ear: Auricle normal appearance. External Auditory Canal within normal limits no lesions or masses,TM w/o masses/lesions/perforations. TM mobility noted.   Left Ear: Auricle normal appearance. External Auditory Canal within normal limits no lesions or masses,TM w/o masses/lesions/perforations. TM mobility noted.  Nose: No gross nasal septal deviation. Inferior Turbinates 3+ bilaterally. No septal perforation. No masses/lesions. External nasal skin appears normal without masses/lesions.  Oral Cavity: Gingiva/lips within normal limits.  Dentition/gingiva healthy appearing. Mucus membranes moist. Floor of mouth soft, no masses palpated. Oral Tongue mobile. Hard Palate appears normal.    Oropharynx: Base of tongue appears normal. No masses/lesions noted. Tonsillar fossa/pharyngeal wall without lesions. Posterior oropharynx WNL.  Soft palate without masses. Midline uvula.   Neck/Lymphatic: No LAD I-VI bilaterally.  No thyromegaly.  No masses noted on exam.      Diagnostic studies:  Audiogram interpreted personally by me and discussed in detail with the patient today.   Pure tone testing revealed mild sloping to moderate sensorineural hearing loss, bilaterally. Speech reception thresholds were obtained at 40 dBHL in the right ear and 35 dBHL in the left ear. Speech discrimination scores were 100% in the right ear and 100% in the left ear. Tympanometry revealed Type A tympanograms in both ears.       Assessment:    ICD-10-CM ICD-9-CM    1. Sensorineural hearing loss (SNHL) of both ears  H90.3 389.18         The encounter diagnosis was Sensorineural hearing loss (SNHL) of both ears.      Plan:  No orders of the defined types were  placed in this encounter.      We reviewed the patient's recent audiogram and hearing loss in detail.  We also discussed that she is a good candidate for hearing aids, if and when he the patient is motivated.    We also discussed the use hearing protection when exposed to loud noise, including lawn equipment. Recommend audiogram in 1 year.   -follow up 1 year or sooner as needed      Thank you kindly for allowing me to participate in the patient's care.       Karlee Gilbert NP

## 2024-05-07 NOTE — PROGRESS NOTES
Luz Duke, a 63 y.o. female was seen today in the clinic for an audiologic evaluation. The patient reports family has significant concerns with her hearing perception. Ms. Duke does not believe she has significant loss. She denies ear pain, drainage, dizziness, and history of noise exposure.     Pure tone testing revealed mild sloping to moderate sensorineural hearing loss, bilaterally. Speech reception thresholds were obtained at 40 dBHL in the right ear and 35 dBHL in the left ear. Speech discrimination scores were 100% in the right ear and 100% in the left ear. Tympanometry revealed Type A tympanograms in both ears.    Results were reviewed with the patient and the recommendation of a hearing aid consultation was discussed.    Recommendations:  Otologic evaluation  Annual audiologic evaluation  Hearing protection in noise  Hearing aid consultation

## 2024-05-13 DIAGNOSIS — R60.0 LOCALIZED EDEMA: ICD-10-CM

## 2024-05-13 NOTE — TELEPHONE ENCOUNTER
Care Due:                  Date            Visit Type   Department     Provider  --------------------------------------------------------------------------------                                MYCHART                              ANNUAL                              CHECKUP/PHY  [facility]  Last Visit: 04-      [facility] -                              PRIMARY      [facility]  Next Visit: 04-      CARE ([facility])   [facility]  Test          Frequency    Reason                     Performed    Due Date  --------------------------------------------------------------------------------    Mg Level....  12 months..  raloxifene...............  Not Found    Overdue    Phosphate...  15 months..  raloxifene...............  Not Found    Overdue    Vitamin D...  15 months..  raloxifene...............  Not Found    Overdue    [employer] Embedded Care Due Messages. Reference number: [id number].   5/13/2024 6:40:31 PM CDT  
[employer] Inter[employer]

## 2024-05-14 RX ORDER — SPIRONOLACTONE 50 MG/1
50 TABLET, FILM COATED ORAL DAILY
Qty: 90 TABLET | Refills: 3 | Status: SHIPPED | OUTPATIENT
Start: 2024-05-14

## 2024-05-15 ENCOUNTER — PATIENT MESSAGE (OUTPATIENT)
Dept: FAMILY MEDICINE | Facility: CLINIC | Age: 64
End: 2024-05-15
Payer: MEDICARE

## 2025-01-03 DIAGNOSIS — M85.89 OSTEOPENIA OF MULTIPLE SITES: ICD-10-CM

## 2025-01-04 NOTE — TELEPHONE ENCOUNTER
Care Due:                  Date            Visit Type   Department     Provider  --------------------------------------------------------------------------------                                MYCHART                              ANNUAL                              CHECKUP/PHY  [facility]  Last Visit: 04-      [facility]  Ezra                               -                              PRIMARY      [facility]  Next Visit: 04-      CARE ([facility])   [facility]  Test          Frequency    Reason                     Performed    Due Date  --------------------------------------------------------------------------------    Mg Level....  12 months..  raloxifene...............  Not Found    Overdue    Phosphate...  15 months..  raloxifene...............  Not Found    Overdue    Vitamin D...  15 months..  raloxifene...............  Not Found    Overdue    [employer] Embedded Care Due Messages. Reference number: [id number].   1/03/2025 8:33:41 PM CST

## 2025-01-07 RX ORDER — RALOXIFENE HYDROCHLORIDE 60 MG/1
60 TABLET, FILM COATED ORAL DAILY
Qty: 90 TABLET | Refills: 3 | Status: SHIPPED | OUTPATIENT
Start: 2025-01-07

## 2025-04-30 ENCOUNTER — OFFICE VISIT (OUTPATIENT)
Dept: FAMILY MEDICINE | Facility: CLINIC | Age: 65
End: 2025-04-30
Payer: MEDICARE

## 2025-04-30 VITALS
WEIGHT: 194.13 LBS | SYSTOLIC BLOOD PRESSURE: 103 MMHG | OXYGEN SATURATION: 95 % | DIASTOLIC BLOOD PRESSURE: 60 MMHG | HEART RATE: 81 BPM | HEIGHT: 64 IN | BODY MASS INDEX: 33.14 KG/M2

## 2025-04-30 DIAGNOSIS — E66.811 CLASS 1 OBESITY DUE TO EXCESS CALORIES WITHOUT SERIOUS COMORBIDITY WITH BODY MASS INDEX (BMI) OF 33.0 TO 33.9 IN ADULT: ICD-10-CM

## 2025-04-30 DIAGNOSIS — Z85.3 HISTORY OF BREAST CANCER: Primary | Chronic | ICD-10-CM

## 2025-04-30 DIAGNOSIS — E66.09 CLASS 1 OBESITY DUE TO EXCESS CALORIES WITHOUT SERIOUS COMORBIDITY WITH BODY MASS INDEX (BMI) OF 33.0 TO 33.9 IN ADULT: ICD-10-CM

## 2025-04-30 DIAGNOSIS — R60.0 LOCALIZED EDEMA: ICD-10-CM

## 2025-04-30 DIAGNOSIS — M85.89 OSTEOPENIA OF MULTIPLE SITES: ICD-10-CM

## 2025-04-30 DIAGNOSIS — F39 MOOD DISORDER: ICD-10-CM

## 2025-04-30 PROBLEM — E66.813 CLASS 3 SEVERE OBESITY DUE TO EXCESS CALORIES WITH SERIOUS COMORBIDITY AND BODY MASS INDEX (BMI) OF 40.0 TO 44.9 IN ADULT: Status: RESOLVED | Noted: 2019-03-29 | Resolved: 2025-04-30

## 2025-04-30 PROBLEM — E66.01 CLASS 3 SEVERE OBESITY DUE TO EXCESS CALORIES WITH SERIOUS COMORBIDITY AND BODY MASS INDEX (BMI) OF 40.0 TO 44.9 IN ADULT: Status: RESOLVED | Noted: 2019-03-29 | Resolved: 2025-04-30

## 2025-04-30 PROBLEM — E66.01 CLASS 3 SEVERE OBESITY DUE TO EXCESS CALORIES WITH SERIOUS COMORBIDITY AND BODY MASS INDEX (BMI) OF 40.0 TO 44.9 IN ADULT: Chronic | Status: RESOLVED | Noted: 2019-03-29 | Resolved: 2025-04-30

## 2025-04-30 PROBLEM — E66.813 CLASS 3 SEVERE OBESITY DUE TO EXCESS CALORIES WITH SERIOUS COMORBIDITY AND BODY MASS INDEX (BMI) OF 40.0 TO 44.9 IN ADULT: Chronic | Status: RESOLVED | Noted: 2019-03-29 | Resolved: 2025-04-30

## 2025-04-30 PROCEDURE — 99999 PR PBB SHADOW E&M-EST. PATIENT-LVL IV: CPT | Mod: PBBFAC,,, | Performed by: FAMILY MEDICINE

## 2025-04-30 PROCEDURE — 99214 OFFICE O/P EST MOD 30 MIN: CPT | Mod: PBBFAC,PN | Performed by: FAMILY MEDICINE

## 2025-04-30 RX ORDER — TIRZEPATIDE 2.5 MG/0.1
2.5 SYRINGE (ML) SUBCUTANEOUS
COMMUNITY

## 2025-04-30 RX ORDER — SPIRONOLACTONE 50 MG/1
50 TABLET, FILM COATED ORAL DAILY
Qty: 90 TABLET | Refills: 3 | Status: SHIPPED | OUTPATIENT
Start: 2025-04-30

## 2025-04-30 RX ORDER — ESCITALOPRAM OXALATE 10 MG/1
10 TABLET ORAL DAILY
Qty: 90 TABLET | Refills: 3 | Status: SHIPPED | OUTPATIENT
Start: 2025-04-30

## 2025-04-30 NOTE — PROGRESS NOTES
"PATIENT VISIT FAMILY MEDICINE    CC:   Medication f/u    HPI:    WEIGHT MANAGEMENT:  She reports significant weight loss of 64 lbs since starting tirzepatide 2.5mg weekly injections in June of last year. Her goal weight is between 180-190 lbs. Her daily diet consists of protein shakes, chicken, and salads, with twice weekly unrestricted eating including steak and pork chops.    MEDICAL HISTORY:  She has a history of breast cancer diagnosed in 2005. She had her first COVID-19 infection in November of last year with mild symptoms following vaccination series.    RECENT PROCEDURES:  Colonoscopy was performed with Dr. TERRAZAS in [place] on 6/14/24. Mammogram was completed at [facility] on 11/19/20. Hearing test was recently performed.    CURRENT MEDICATIONS:  She reports good mood control with Lexapro (escitalopram).          MEDS: Current Medications[1]    OBJECTIVE:   Vitals:    04/30/25 0927   BP: 103/60   BP Location: Left arm   Patient Position: Sitting   Pulse: 81   SpO2: 95%   Weight: 88 kg (194 lb 1.8 oz)   Height: 5' 4" (1.626 m)     Body mass index is 33.32 kg/m².      Physical Exam    Constitutional: Normal appearance.  HENT: Normocephalic.  Eyes: No discharge bilaterally. Extraocular movements intact.  Cardiovascular: Normal rate and regular rhythm. Normal heart sounds.  Pulmonary: Pulmonary effort is normal. Normal breath sounds.  Abdominal: Abdomen is flat. Bowel sounds are normal. No distension. Abdomen is soft. No mass. No tenderness. No guarding. No rebound. No hernia is present.  Skin: Warm. Dry.  Neurological: Alert.  Psychiatric: Behavior normal.  Vitals: Weight: 194 lbs.          LABS:   A1C:  Recent Labs   Lab 04/17/25  1010   Hemoglobin A1c 4.9     CBC:  Recent Labs   Lab 04/17/25  1010   WBC 7.53   RBC 4.46   HGB 12.5   HCT 38.8   Platelet Count 296   MCV 87   MCH 28.0   MCHC 32.2     CMP:  Recent Labs   Lab 04/17/25  1010   Glucose 81   Calcium 8.9   Albumin 3.4 L   Protein Total 6.4   Sodium " 142   Potassium 4.2   CO2 28   Chloride 109   BUN 19   Creatinine 0.6   ALP 81   ALT 22   AST 17   Bilirubin Total 0.5     LIPIDS:  Recent Labs   Lab 04/21/23  0843 04/30/24  1112 04/17/25  1010   TSH 2.940  --   --    HDL 55   < >  --    HDL Cholesterol  --   --  57   Cholesterol Total  --   --  147   Cholesterol 149   < >  --    Triglycerides 66   < >  --    Triglyceride  --   --  54   LDL Cholesterol 80.8   < > 79.2   HDL/Cholesterol Ratio 36.9   < > 38.8   Non-HDL Cholesterol 94   < >  --    Non HDL Cholesterol  --   --  90   Total Cholesterol/HDL Ratio 2.7   < >  --    Cholesterol/HDL Ratio  --   --  2.6    < > = values in this interval not displayed.     TSH:  Recent Labs   Lab 04/21/23  0843   TSH 2.940         ASSESSMENT & PLAN:    Problem List Items Addressed This Visit          Psychiatric    Mood disorder (Chronic)    Overview   Well controlled on lexapro           Relevant Medications    EScitalopram oxalate (LEXAPRO) 10 MG tablet       Oncology    History of breast cancer - Primary (Chronic)    Overview   - 2005  - left breast s/p chemo and radiation  - followed by Dr. George Fenton [place]            Endocrine    Class 1 obesity due to excess calories without serious comorbidity with body mass index (BMI) of 33.0 to 33.9 in adult    Overview   Body mass index is 33.32 kg/m².  The patient is asked to make an attempt to improve diet and exercise patterns to aid in medical management of this problem.              Orthopedic    Osteopenia (Chronic)    Overview   - bone densities improved from 2019 DEXA  - okay to continue Evista (no need for additional fosamx stopped 11/6/21)   - fall prevention  - weight bearing exercises like walking, squats, stair and jogging if able  - over the counter vitamin D3 8322-9728 units daily  - dietary calcium 1200 mg per day with diet or supplements   - repeat bone density scan in 2-3 years              Other    Localized edema (Chronic)    Overview   - chronic left distal  leg swelling since recurrent cellulitis  - recommend compression hose daily  - recommend trial off of diuretic or only use PRN and pt agreeable to trial          Relevant Medications    spironolactone (ALDACTONE) 50 MG tablet       F/u annually           RTC/ED precautions discussed where applicable.   Encouraged patient to let me know if there are any further questions/concerns.     Advise patient/caretaker to check with insurance regarding orders to avoid unexpected fees/costs.     The patient/caretaker indicates understanding of these issues and agrees with the plan.    This note was generated with the assistance of ambient listening technology. I attest to having reviewed and edited the generated note for accuracy, though some syntax or spelling errors may persist. Please contact the author of this note for any clarification.      Dr. Vanda Munguia MD  Family Medicine         [1]   Current Outpatient Medications:     ascorbic acid, vitamin C, (VITAMIN C) 1000 MG tablet, , Disp: , Rfl:     azelastine (ASTELIN) 137 mcg (0.1 %) nasal spray, 1 spray (137 mcg total) by Nasal route 2 (two) times daily., Disp: 30 mL, Rfl: 0    calcium carbonate (OS-LIANET) 600 mg calcium (1,500 mg) Tab, Take 1 tablet by mouth 2 (two) times daily with meals., Disp: , Rfl:     cholecalciferol, vitamin D3, 125 mcg (5,000 unit) Tab, Take 5,000 Units by mouth once daily., Disp: , Rfl:     diphenhydrAMINE-acetaminophen (TYLENOL PM)  mg Tab, Take 1 tablet by mouth nightly as needed., Disp: , Rfl:     fluticasone propionate (FLONASE) 50 mcg/actuation nasal spray, 1 spray (50 mcg total) by Each Nostril route once daily., Disp: 11.1 mL, Rfl: 0    LORATADINE ORAL, Take by mouth., Disp: , Rfl:     MULTIVIT 33-MTFOLATE-NAC-CHROM ORAL, Take by mouth once daily., Disp: , Rfl:     multivitamin (THERAGRAN) per tablet, Take 1 tablet by mouth once daily., Disp: , Rfl:     NON FORMULARY MEDICATION, 500 mg 3 (three) times daily. tumeric, Disp: , Rfl:      raloxifene (EVISTA) 60 mg tablet, Take 1 tablet (60 mg total) by mouth once daily., Disp: 90 tablet, Rfl: 3    tirzepatide 2.5 mg/0.1 mL Syrg, Inject 2.5 mg into the skin every 7 days., Disp: , Rfl:     EScitalopram oxalate (LEXAPRO) 10 MG tablet, Take 1 tablet (10 mg total) by mouth once daily., Disp: 90 tablet, Rfl: 3    spironolactone (ALDACTONE) 50 MG tablet, Take 1 tablet (50 mg total) by mouth once daily., Disp: 90 tablet, Rfl: 3